# Patient Record
Sex: FEMALE | Race: WHITE | NOT HISPANIC OR LATINO | Employment: FULL TIME | URBAN - METROPOLITAN AREA
[De-identification: names, ages, dates, MRNs, and addresses within clinical notes are randomized per-mention and may not be internally consistent; named-entity substitution may affect disease eponyms.]

---

## 2017-05-02 ENCOUNTER — ALLSCRIPTS OFFICE VISIT (OUTPATIENT)
Dept: OTHER | Facility: OTHER | Age: 27
End: 2017-05-02

## 2017-05-02 ENCOUNTER — GENERIC CONVERSION - ENCOUNTER (OUTPATIENT)
Dept: OTHER | Facility: OTHER | Age: 27
End: 2017-05-02

## 2017-06-12 ENCOUNTER — ALLSCRIPTS OFFICE VISIT (OUTPATIENT)
Dept: OTHER | Facility: OTHER | Age: 27
End: 2017-06-12

## 2017-09-15 ENCOUNTER — ALLSCRIPTS OFFICE VISIT (OUTPATIENT)
Dept: OTHER | Facility: OTHER | Age: 27
End: 2017-09-15

## 2017-11-10 ENCOUNTER — ALLSCRIPTS OFFICE VISIT (OUTPATIENT)
Dept: OTHER | Facility: OTHER | Age: 27
End: 2017-11-10

## 2017-11-11 NOTE — PROGRESS NOTES
Assessment    1  Insomnia, unspecified type (780 52) (G47 00)   2  Anxiety (300 00) (F41 9)   3  Cervicalgia (723 1) (M54 2)    Plan  Insomnia, unspecified type    · ClonazePAM 0 5 MG Oral Tablet (KlonoPIN); TAKE 1/2 to 1 TABLET AT BEDTIME   · Follow Up if Not Better Evaluation and Treatment  Follow-up  Status: Complete  Done:10Nov2017 02:41PM    Discussion/Summary    Discussed with the patient, stop xanax due to not working nand try low dose klonopin at night  ok to take tylenol or ibuprofen for muscle spasm  f/up 2-3 months, call if any issues  hold off on any further sleep aids for now, had vivid dreaming from melatonin  ok to cont with work outs  avoid caffeine and alcohol , do not drink 2 hours before bed time  MRI of neck if not better, recomm massage  The patient was counseled regarding  Chief Complaint  pt here c/o not being able to sleep the last month and wakes up extremely exhausted and then her anxiety seems to get worse, pt is not sure what exactly is wrong      History of Present Illness  Insomnia (Brief): The patient is being seen for an initial evaluation of insomnia  Symptoms:  difficulty staying asleep,-- unrefreshing sleep,-- daytime sleepiness-- and-- anxiety upon awakening, but-- no difficulty falling asleep,-- no parasomnia-- and-- no sleeping at inappropriate times  No associated symptoms are reported  Neck Pain (Follow-Up): The patient is being seen for follow-up of neck pain  Occurred april 2016 and then restarted 1 week ago,after she moved  L trapezious   Anxiety Disorder (Follow-Up): The patient is being seen for follow-up of generalized anxiety disorder  The patient reports doing well  Intermittent flares   She has had no significant interval events  Associated symptoms: no grandiosity  Medications:  the patient is adherent to her medication regimen, but-- she denies medication side effects  Disease management:  the patient is doing well with her goals        Review of Systems   Constitutional: no fever,-- not feeling poorly,-- no recent weight gain,-- no chills,-- not feeling tired-- and-- no recent weight loss  Cardiovascular: no palpitations  Respiratory: no shortness of breath  Gastrointestinal: no nausea,-- no vomiting-- and-- no diarrhea  Genitourinary: no dysuria  Musculoskeletal: myalgias-- and-- neck- had xrays done  Neurological: no further panic attacks, but-- no headache,-- no numbness,-- no tingling,-- no confusion,-- no dizziness-- and-- no fainting  Other Symptoms: sleep issues  Active Problems  1  Anxiety (300 00) (F41 9)   2  Bilateral impacted cerumen (380 4) (H61 23)   3  Birth control (V25 9) (Z30 9)   4  Cervical strain (847 0) (S16 1XXA)   5  Cervicalgia (723 1) (M54 2)   6  Neck muscle spasm (728 85) (M62 838)   7  Rhinitis (472 0) (J31 0)    Social History     · Current some day smoker (305 1) (F17 200)   · Education history   · college degree   · Lives with roommates   · 2 - male and female in 19's   · No caffeine use   · No illicit drug use   · Occupation   · 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   · Single   · Social alcohol use (Z78 9)    Current Meds   1  ALPRAZolam 0 5 MG Oral Tablet; TAKE 1 TABLET TWICE DAILY AS NEEDED; Therapy: 19Sep2014 to (Evaluate:14Nov2017); Last Rx:15Sep2017 Ordered   2  Flonase Allergy Relief 50 MCG/ACT Nasal Suspension; USE 2 SPRAYS IN EACH NOSTRIL ONCE DAILY; Therapy: (Recorded:13Sep2017) to Recorded   3  Magnesium 250 MG Oral Tablet; TAKE 1 TABLET DAILY; Therapy: (Recorded:10Nov2017) to Recorded   4  Vitamin B12 TABS; TAKE 1 TABLET DAILY AS DIRECTED; Therapy: (Recorded:10Nov2017) to Recorded    The medication list was reviewed and updated today  Allergies  1  No Known Drug Allergies  2   Seasonal    Vitals   Recorded: [id number] 02:25PM   Temperature 98 3 F, Oral   Heart Rate 70   Systolic 926, LUE, Sitting   Diastolic 82, LUE, Sitting   Height 5 ft 6 in   Weight 167 lb    BMI Calculated 26 95   BSA Calculated 1 85   O2 Saturation 98, RA       Physical Exam   Constitutional  General appearance: No acute distress, well appearing and well nourished  Eyes  Conjunctiva and lids: No swelling, erythema or discharge  Pupils and irises: Equal, round and reactive to light  Ears, Nose, Mouth, and Throat  External inspection of ears and nose: Normal  -- cerumenimpaction b/l  Oropharynx: Normal with no erythema, edema, exudate or lesions  Pulmonary  Respiratory effort: No increased work of breathing or signs of respiratory distress  Auscultation of lungs: Clear to auscultation  Cardiovascular  Auscultation of heart: Normal rate and rhythm, normal S1 and S2, without murmurs  Examination of extremities for edema and/or varicosities: Normal    Carotid pulses: Normal    Abdomen  Abdomen: Non-tender, no masses  Liver and spleen: No hepatomegaly or splenomegaly  Musculoskeletal  Gait and station: Normal    Inspection/palpation of joints, bones, and muscles: Abnormal  -- b/l trapezious hypertonicity  Skin  Skin and subcutaneous tissue: Normal without rashes or lesions  Neurologic  Cranial nerves: Cranial nerves 2-12 intact  Reflexes: 2+ and symmetric  Sensation: No sensory loss     Psychiatric  Orientation to person, place, and time: Normal    Mood and affect: Normal          Signatures   Electronically signed by : Keisha Garcia DO; Nov 10 2017  2:43PM EST                       (Author)

## 2018-01-13 VITALS
HEART RATE: 81 BPM | SYSTOLIC BLOOD PRESSURE: 120 MMHG | DIASTOLIC BLOOD PRESSURE: 80 MMHG | BODY MASS INDEX: 26.71 KG/M2 | OXYGEN SATURATION: 98 % | TEMPERATURE: 98.9 F | WEIGHT: 165.5 LBS

## 2018-01-13 VITALS
BODY MASS INDEX: 26.4 KG/M2 | HEIGHT: 66 IN | OXYGEN SATURATION: 98 % | HEART RATE: 78 BPM | SYSTOLIC BLOOD PRESSURE: 112 MMHG | DIASTOLIC BLOOD PRESSURE: 80 MMHG | WEIGHT: 164.25 LBS | TEMPERATURE: 98.3 F

## 2018-01-13 NOTE — MISCELLANEOUS
To whom it may concern:    Parish Thomas , CHELSEA 1990, with a standing desk at work  Please do not hesitate to contact me with any questions,          Sincerely,          JAMES Cuenca        Electronically signed by:Della Galindo DO  Nov 10 2017  2:45PM EST

## 2018-01-14 VITALS
SYSTOLIC BLOOD PRESSURE: 122 MMHG | HEIGHT: 66 IN | TEMPERATURE: 98.3 F | BODY MASS INDEX: 26.84 KG/M2 | DIASTOLIC BLOOD PRESSURE: 82 MMHG | OXYGEN SATURATION: 98 % | WEIGHT: 167 LBS | HEART RATE: 70 BPM

## 2018-01-15 VITALS
SYSTOLIC BLOOD PRESSURE: 114 MMHG | DIASTOLIC BLOOD PRESSURE: 80 MMHG | TEMPERATURE: 97.9 F | BODY MASS INDEX: 26.74 KG/M2 | HEART RATE: 65 BPM | HEIGHT: 66 IN | OXYGEN SATURATION: 97 % | WEIGHT: 166.38 LBS

## 2021-04-25 ENCOUNTER — IMMUNIZATIONS (OUTPATIENT)
Dept: FAMILY MEDICINE CLINIC | Facility: HOSPITAL | Age: 31
End: 2021-04-25

## 2021-04-25 DIAGNOSIS — Z23 ENCOUNTER FOR IMMUNIZATION: Primary | ICD-10-CM

## 2021-04-25 PROCEDURE — 0001A SARS-COV-2 / COVID-19 MRNA VACCINE (PFIZER-BIONTECH) 30 MCG: CPT

## 2021-04-25 PROCEDURE — 91300 SARS-COV-2 / COVID-19 MRNA VACCINE (PFIZER-BIONTECH) 30 MCG: CPT

## 2021-05-23 ENCOUNTER — IMMUNIZATIONS (OUTPATIENT)
Dept: FAMILY MEDICINE CLINIC | Facility: HOSPITAL | Age: 31
End: 2021-05-23

## 2021-05-23 DIAGNOSIS — Z23 ENCOUNTER FOR IMMUNIZATION: Primary | ICD-10-CM

## 2021-05-23 PROCEDURE — 0002A SARS-COV-2 / COVID-19 MRNA VACCINE (PFIZER-BIONTECH) 30 MCG: CPT

## 2021-05-23 PROCEDURE — 91300 SARS-COV-2 / COVID-19 MRNA VACCINE (PFIZER-BIONTECH) 30 MCG: CPT

## 2023-06-28 ENCOUNTER — OFFICE VISIT (OUTPATIENT)
Dept: DERMATOLOGY | Facility: CLINIC | Age: 33
End: 2023-06-28
Payer: COMMERCIAL

## 2023-06-28 VITALS — HEIGHT: 66 IN | BODY MASS INDEX: 26.92 KG/M2 | WEIGHT: 167.5 LBS | TEMPERATURE: 98.4 F

## 2023-06-28 DIAGNOSIS — L70.9 ACNE, UNSPECIFIED ACNE TYPE: ICD-10-CM

## 2023-06-28 DIAGNOSIS — Z12.83 SCREENING FOR SKIN CANCER: Primary | ICD-10-CM

## 2023-06-28 RX ORDER — DEXTROAMPHETAMINE SACCHARATE, AMPHETAMINE ASPARTATE, DEXTROAMPHETAMINE SULFATE AND AMPHETAMINE SULFATE 2.5; 2.5; 2.5; 2.5 MG/1; MG/1; MG/1; MG/1
TABLET ORAL
COMMUNITY
Start: 2020-06-15

## 2023-06-28 RX ORDER — ALPRAZOLAM 0.5 MG/1
0.5 TABLET ORAL DAILY PRN
COMMUNITY
Start: 2015-09-05

## 2023-06-28 NOTE — PATIENT INSTRUCTIONS
"Physical Exam and Assessment/Plan by Diagnosis:    SCREENING FOR SKIN CANCER    Assessment and Plan:  Based on a thorough discussion of this condition and the management approach to it (including a comprehensive discussion of the known risks, side effects and potential benefits of treatment), the patient (family) agrees to implement the following specific plan: [place] SPF 30+ when outside with reapplication every 2-3 hours  Skin exams recommended every 6 months - Patient will call to schedule or schedule via the portal  Follow up sooner with any new or changing skin problems    ACNE VULGARIS (\"COMMON ACNE\")    Treatment plan:Based on a thorough discussion of this condition and the management approach to it (including a comprehensive discussion of the known risks, side effects and potential benefits of treatment), the patient (family) agrees to implement the following specific plan: Topical medications:    Skin Medicinals Regimen prescribed, today - Apply topically as directed        REMEMBER:  Always take your acne pills with lots of water! A pill stuck in your throat can cause significant burning and irritation  Drink a full glass of water to ensure the pill gets into your stomach  Avoid “popping” a pill right before bed, and stay upright for at least 1 hour after taking a pill  ACNE:  WHAT ZIT ALL ABOUT? WHY DO I HAVE ACNE/PIMPLES? Your skin is made of layers  To keep the skin from becoming dry and cracked, the skin needs oil  The oil is made in little wells in the deeper layers in the skin  People with acne have glands that make more oil and are more easily plugged, causing the glands to swell  Hormones, bacteria and your inherited tendency to have acne all play a role  The medical term for “pimples” is acne or acne vulgaris (vulgaris means “common”)  Most people get some acne  Acne does not come from being dirty    Instead, it is an expected consequence of changes that occur during normal growth " and development  Hormones, bacteria, and your family's tendency to have acne may all play a role  “Whiteheads” or “blackheads” are openings of the glands (glands are the oil factories) onto the surface of the skin  “Blackheads” are not caused by dirt blocking the pores; instead, they result from the oxidation reaction of oil and skin in the pores with the air (like a “rust” reaction)  WHAT ABOUT STRESS? Stress does not “cause” acne but it can make it worse  Make sure you get enough sleep and daily exercise! WHAT ABOUT FOODS/DIET? Try to eat a balanced, healthy diet  Some people feel that certain foods worsen their acne  While there aren't many studies available on this question, severe dietary changes are unlikely to help your acne and may be harmful to the health of your skin  If you find that a certain food seems to aggravate your acne, you may consider avoiding that food  Discuss this with your physician! WHAT CAUSES MY ACNE? There are four contributors to acne--the body's natural oil (sebum), clogged pores, bacteria (with the scientific name Propionibacterium acnes, or P  acnes, for short), and the body's reaction to the bacteria living in the clogged pores (which causes inflammation)  Here's what happens:    Sebum is produced in the normal oil-making glands in the deeper layers of the skin and reaches the surface through the skin's pores  An increase in certain hormones occurs around the time of puberty, and these hormones trigger the oil glands to produce increased amounts of sebum  Pores with excess oil tend to become clogged more easily  At the same time, P  acnes--one of the many types of bacteria that normally live on everyone's skin--thrives in the excess oil and causes a skin reaction (inflammation)  If a pore is clogged close to the surface, there is little inflammation   However, this results in the formation of “whiteheads” (closed comedones) or “blackheads” (open comedones) at the surface of the skin  A plug that extends to, or forms a little deeper in the pore, or one that enlarges or ruptures may cause more inflammation  The result is red bumps (papules) and pus-filled pimples (pustules)  If plugging happens in the deepest skin layer, the inflammation may be even more severe, resulting in the formation of nodules or cysts  When these types of acne heal, they may leave behind discolored areas or true scars  SKIN HYGIENE:  HOW SHOULD I [address] Labidi MY SKIN? Acne does not come from being dirty, however, washing your face is part of taking good care of your skin and will help keep your face clear  Good skin hygiene is, therefore, critical to support any acne treatment plan  Here are several specific suggestions for practicing good skin hygiene and keeping your skin looking its best:    You should wash acne-prone skin TWICE A DAY: Once in the morning and once in the evening  This does include any showers you take that day, so do not overdo it! Do not scrub the skin with a washcloth or loofah as these can irritate and inflame your acne  Acne does not come from “dirt”, so it is not necessary to scrub the skin clean  In fact, scrubbing may lead to dryness and irritation that makes the acne even worse and harder for patients to tolerate acne medications  Use a gentle facial moisturizing cleanser (Cetaphil Moisturizing Cleanser or Dove Fragrance-Free bar)  Avoid using soaps like Agustin Rios, Desiree Fernández [address], [facility], or soft/liquid soaps as these products will dry your skin  Do not use any over-the-counter “acne washes” without your doctor's specific instruction to do so  These products often contain salicylic acid or benzoyl peroxide  These ingredients can be helpful in clearing oil from the skin and reducing bacteria, but they may also be drying and can add to irritation     Do not use exfoliating products with microbeads or brushes as these can cause irritation to the skin which can worsen the acne  Facials and other treatments to remove, squeeze, or “clean out” pores, if done by a , can help the acne  They should not be done more than every 8 weeks  Try not to “pop pimples” or pick at your acne as this can delay healing and may result in scarring or skin color changes (“dark spots”) that are often more noticeable than the acne itself  Picking/popping acne can also cause a serious skin infection  Wash or change your pillow case once to twice a week, especially if you use products in your hair  Wash the skin as soon as possible after playing sports or other activities that cause a lot of sweating  Also, pay attention to how your sports equipment (shoulder pads, helmet strap, etc ) might be making your acne worse  When you use makeup, moisturizer, or sunscreen make sure that these products are labeled “non-comedogenic,” or “won't clog pores,” or “won't cause acne ”           WHAT ACNE TREATMENTS ARE AVAILABLE? Medications for acne try to stop the formation of new pimples by reducing or removing the oil, bacteria, and other things (like dead skin cells) that clog the pores  They can also decrease the inflammation or irritation response of the skin to bacteria  It may take from 6 to 8 weeks (about 2 months!) before you see any improvement and know if the medication is effective  It takes the layers of skin this long to regenerate  Remember, these medications do not “cure” the condition--the acne improves because of the medication  Therefore, treatment must be continued in order to prevent the return of acne lesions  There are many types of acne treatments  Some are applied to the skin (“topical” medications) and some are taken by mouth (“oral” medications)  In most cases of mild acne, the doctor will start with a topical medication  There are many different topical medications that are helpful for acne    If acne is more severe and it does not respond adequately to a topical medication, or if it covers large body surface areas such as the back and/or chest, oral antibiotics such as Doxycycline or Minocycline and/or oral hormone therapy such as Oral Contraceptive Pills or Spironolactone may be prescribed  In the most severe cases, isotretinoin (Accutane) may be used  In general, it is usually best to start with acne medications that are least likely to cause side effects but are at the same time capable of addressing the specific causes for the acne  Some patients have a good result with just one medication, but many will need to use a combination of treatments: two or more different topical agents or an oral medication plus a topical medication  Another treatment used for acne may include corticosteroid injections, which are used to help relieve pain, decrease the size, and encourage the healing of large, inflamed acne nodules  Also, dermatologists sometimes perform “acne surgery,” using a fine needle, a pointed blade, or an instrument known as a comedone extractor to mechanically clean out clogged pores  One must always weigh the risk for inducing a scar with the potential benefits of any procedure  Prior treatment with topical retinoids can “loosen” whiteheads and blackheads and make it easier to physically remove such lesions  Heat-based devices, and light and laser therapy are being studied to see whether there is any role for such treatments in mild to moderate acne  At this time, there is not enough evidence to make general recommendations about their use  TOPICAL ACNE MEDICATIONS    WHAT KIND OF TOPICALS ARE THERE? Benzoyl peroxide (BP) helps to fight inflammation and is anti-microbial (kills bacteria, viruses, and other microorganisms) and is believed to help prevent resistance of bacteria to topical antibiotics  A benzoyl peroxide “wash” may be recommended for use on large areas such as the chest and/or back   Mild irritation and dryness are common when first using benzoyl peroxide-containing products  Be careful because benzoyl peroxide can bleach towels and clothing! Retinoids (such as adapalene, tretinoin, or tazarotene) unplug the oil glands by helping peel away the layers of skin and other things plugging the opening of the glands  Mild irritation and dryness are common when first using these products  Facial waxing and other skin procedures can lead to excessive irritation and should be avoided during retinoid therapy  Antibiotics fight bacteria and help decrease inflammation  Topical antibiotics commonly used in acne include clindamycin, erythromycin, and combination agents (such as clindamycin/benzoyl peroxide or erythromycin/benzoyl peroxide)  Mild irritation and dryness are common when first using these products  Typically, topical antibiotics should not be used alone as treatment for acne  Other topical agents include salicylic acid, azelaic acid, dapsone, and sulfacetamide  Mild irritation and dryness can also occur when first using these products  USING YOUR TOPICAL TREATMENTS LIKE A PRO  Apply topical medications only to clean, dry skin  Topical medications may lead to significant dryness of the affected areas  To minimize this, wait 15-20 minutes after washing before applying your topical medication  These medications work deep in the skin to prevent new breakouts  “Spot treatment” of individual pimples does not do much  When applying topical medications to the face, use the “5-dot” method  Start by placing a small pea-sized amount of the medication on your finger  Then, place “dots” in each of five locations of your face: Mid-forehead, each cheek, nose, and chin  Next, rub the medication into the entire area of skin - not just on individual pimples! Try to avoid the delicate skin around your eyes and corners of your mouth  The medications are not magic! They take weeks if not months to work   Be patient and use your medicine on a daily basis or as directed for six weeks before asking if your skin looks better  Try not to miss more than one or two days each week when using your medications  If you are starting a new medication, then try using it “every other night” or even “every third night ” Gradually work up to [facility] a day ”  This will give your skin time to adjust   The same medications often come in various forms or formulations: Creams, ointments, lotions, gels, microspheres, or foams  Use the formulation that has been recommended and don't switch to other forms unless instructed  Some forms (such as alcohol based gels) may be more drying and less tolerable for certain skin types  Sometimes individual medications are not as effective as a combination of two or more agents  The doctor may need to try several medications or combinations before finding the one that is best for that patient  Moisturizer, sunscreen, and make-up may be used in conjunction with topical acne medications  In general, acne medications are applied first so they may directly contact the skin  Ask your physician to review specific application instructions! It is especially important to always use sunscreen when using a topical retinoid or oral antibiotic  These drugs can make your skin more sensitive to the sun  In general, sunscreen gets applied AFTER any acne medications  Don't stop using your acne medications just because your acne got better  Remember, the acne is better because of the medication, and prevention is the rodriguez to treatment  ORAL ACNE MEDICATIONS    ORAL ANTIBIOTICS  Antibiotics include tetracycline-class medicines (which include the most commonly used oral antibiotics for acne, minocycline, and doxycycline), erythromycin, trimethoprim-sulfamethoxazole, and occasionally cephalexin or azithromycin  These drugs may decrease bacteria and inflammation, and they are most effective for moderate-to-severe “inflammatory” acne   A product containing benzoyl "peroxide should be used along with these antibiotics to help decrease the possibility of microbial resistance  Always take your acne pills with lots of water! A pill stuck in your throat can cause significant burning and irritation  Drink a full glass of water to ensure the pill gets into your stomach  Avoid “popping” a pill right before bed, and stay upright for at least 1 hour after taking a pill  HORMONAL THERAPY  Hormonal treatment is used only in females and usually consists of oral contraceptives (birth control pills)  Spironolactone is also sometimes used  ORAL CONTRACEPTIVE PILLS   This medication is also known as the “Birth Control Pill ”  We use it for hormonal regulation of acne  Take this medication as directed on the medication packet  NOTE: Try to find a regular time in your day to take the pill so that you don't forget  The best time is about half an hour after a meal or snack, or at bedtime  If you do forget to take your daily pill at the regular time, take one as soon as you remember and take the next at your regular scheduled time  WARNING: Do not take this medication until discussing it with your physician if you smoke, are pregnant (or trying to become pregnant or could be pregnant), have a personal history of breast cancer, have any artificial hardware or implants, have a condition called Factor 5 Leiden deficiency, have a family history of clotting problems, regularly have migraine headaches (especially with aura or due to flashing lights), or have any vaginal bleeding other than that associated with your menstrual cycle  SPIRONOLACTONE  This medication was originally used as a \"water-pill\" for patients with high blood pressure  Dermatologists use it in low doses to block the effects of male hormone on the hair follicle and oil glands      ORAL ISOTRETINOIN (used to be called the brand name “ACCUTANE”)  Isotretinoin, a derivative of vitamin A, is a powerful drug with " several significant potential side effects  It is reserved for acne which is severe or when other medications have not worked well enough  It used to be sold under the brand name “Accutane” but now several versions exist       HAVING PROBLEMS WITH ANY OF YOUR TREATMENTS? You should not be able to see any of the medicines on your face  If you can see a white film on your skin after you apply the medication, there is too much medicine in that area and you need to apply a thinner coat and make sure it is spread evenly on your face  If your skin gets too dry, you can apply a light (“non-comedogenic”) moisturizer on top of your medicine or you may switch to using the medicine every other day instead of every day  If your skin is still too irritated, you may need to switch to a milder medication  If your skin is red and very itchy, you may be allergic to the medication and you should stop using it  COMMON POSSIBLE SIDE EFFECTS OF MEDICATIONS    Retinoids - dryness, redness, increased sun sensitivity  Spironolactone - headache, upset stomach, dizziness, breast tenderness or irregular periods  Usually these effects lessen with time  Benzoyl peroxide - drying, redness, bleaching of clothes, towels and sheets, allergy  Doxycycline - headaches; dizziness; irritation of the throat; nail changes; discoloration of teeth  Sun sensitivity - even if you have dark skin, this medicine can make you burn more easily  Make sure you protect yourself from the sun, either by avoiding being outside between 11 AM and 3 PM, wearing and reapplying sunscreen/sunblock, or wearing sun protective clothing  Nausea/vomiting - if you experience nausea with this medication, take it with food  Minocycline - headaches; dizziness; vision problems,  irritation of the throat; discoloration of scars, gums, or teeth  Can rarely cause liver disease, joint pains, and flu-like symptoms      If you should notice yellowing of the skin or any of the above, notify your doctor and stop using the medication  Birth Control Pills - nausea; headaches; breast tenderness; feeling bloated; mood changes  Spotting between periods may occur for the first three weeks of the medication, but this is not serious  It may last for two or three cycles  Please call us if the bleeding is heavier than a light flow or lasts for more than a few days  WHEN AND WHERE TO CALL WITH CONCERNS  We are here to help! If you experience any unusual symptoms, then stop taking or using the medication and call our office at [phone] ([phone])  It is better to be safe than to be sorry! [facility]     We use this service to prescribe medications that are often not covered by insurance  Your physician will send your prescription to the pharmacy  You will receive an email from [email or web address] where you will follow the instructions within the email to provide your billing and shipping information  Your medicine will be shipped directly to you  If you have any questions, you can call [phone] or email [email or web address]

## 2023-06-28 NOTE — PROGRESS NOTES
"[facility] Note     Patient Name: Shana Germain  Encounter Date: 6/28/23    Have you been cared for by a [facility] in the last 3 years and, if so, which description applies to you? NO  I am considered a \"new\" patient and must complete all patient intake questions  I am FEMALE/of child-bearing potential     REVIEW OF SYSTEMS:  Have you recently had or currently have any of the following? · Recent fever or chills? No  · Any non-healing wound? No  · Are you pregnant or planning to become pregnant? No  · Are you currently or planning to be nursing or breast feeding? No   PAST MEDICAL HISTORY:  Have you personally ever had or currently have any of the following? If \"YES,\" then please provide more detail  · Skin cancer (such as Melanoma, Basal Cell Carcinoma, Squamous Cell Carcinoma? No  · Tuberculosis, HIV/AIDS, Hepatitis B or C: No  · Systemic Immunosuppression such as Diabetes, Biologic or Immunotherapy, Chemotherapy, Organ Transplantation, Bone Marrow Transplantation No  · Radiation Treatment No   FAMILY HISTORY:  Any \"first degree relatives\" (parent, brother, sister, or child) with the following? • Skin Cancer, Pancreatic or Other Cancer? YES, Family history of skin cancer, colon cancer and lung cancer   PATIENT EXPERIENCE:    • Do you want the Dermatologist to perform a COMPLETE skin exam today including a clinical examination under the \"bra and underwear\" areas? Yes  • If necessary, do we have your permission to call and leave a detailed message on your Preferred Phone number that includes your specific medical information? Yes      Not on File No current outpatient medications on file            • Whom besides the patient is providing clinical information about today's encounter?   o NO ADDITIONAL HISTORIAN (patient alone provided history)    Physical Exam and Assessment/Plan by Diagnosis:    SCREENING FOR SKIN CANCER    Physical Exam:  • Anatomic Location Affected:  " "Entire body   • Morphological Description:    • Pertinent Positives:  • Pertinent Negatives: Additional History of Present Condition:  New patient, 28year old female, here today for a general skin exam  Patient denies any personal history of skin cancer but she does have a family history of skin cancer  Patient has had multiple moles removed before but they have all came back benign  Patient reports having freckley skin since childhood  The patient noted that she had 1 lesion removed years ago and it was 50% mutated  Assessment and Plan:  Based on a thorough discussion of this condition and the management approach to it (including a comprehensive discussion of the known risks, side effects and potential benefits of treatment), the patient (family) agrees to implement the following specific plan:      • SPF 30+ when outside with reapplication every 2-3 hours  • Skin exams recommended every 6 months - Patient will call to schedule or schedule via the portal  • Follow up sooner with any new or changing skin problems    History of ACNE (\"COMMON ACNE\")    Physical Exam:  • Anatomic Location Affected:  Chest    Additional History of Present Condition:  Addressed during exam     Treatment plan:Based on a thorough discussion of this condition and the management approach to it (including a comprehensive discussion of the known risks, side effects and potential benefits of treatment), the patient (family) agrees to implement the following specific plan: Topical medications:    [facility] Regimen prescribed, today - Apply topically as directed    Tretinoin: 0 025%  Niacinamide: 2%  Azelaic Acid: 8%  Sodium Hyaluronate: 0 25%  Vehicle: Cream    Apply sparingly 1 hr before bedtime to affected areas  Not for use during pregnancy  REMEMBER:  Always take your acne pills with lots of water! A pill stuck in your throat can cause significant burning and irritation     Drink a full glass of water to ensure the pill " gets into your stomach  Avoid “popping” a pill right before bed, and stay upright for at least 1 hour after taking a pill  ACNE:  WHAT ZIT ALL ABOUT? WHY DO I HAVE ACNE/PIMPLES? Your skin is made of layers  To keep the skin from becoming dry and cracked, the skin needs oil  The oil is made in little wells in the deeper layers in the skin  People with acne have glands that make more oil and are more easily plugged, causing the glands to swell  Hormones, bacteria and your inherited tendency to have acne all play a role  The medical term for “pimples” is acne or acne vulgaris (vulgaris means “common”)  Most people get some acne  Acne does not come from being dirty  Instead, it is an expected consequence of changes that occur during normal growth and development  Hormones, bacteria, and your family's tendency to have acne may all play a role  “Whiteheads” or “blackheads” are openings of the glands (glands are the oil factories) onto the surface of the skin  “Blackheads” are not caused by dirt blocking the pores; instead, they result from the oxidation reaction of oil and skin in the pores with the air (like a “rust” reaction)  WHAT ABOUT STRESS? Stress does not “cause” acne but it can make it worse  Make sure you get enough sleep and daily exercise! WHAT ABOUT FOODS/DIET? Try to eat a balanced, healthy diet  Some people feel that certain foods worsen their acne  While there aren't many studies available on this question, severe dietary changes are unlikely to help your acne and may be harmful to the health of your skin  If you find that a certain food seems to aggravate your acne, you may consider avoiding that food  Discuss this with your physician! WHAT CAUSES MY ACNE?   There are four contributors to acne--the body's natural oil (sebum), clogged pores, bacteria (with the scientific name Propionibacterium acnes, or P  acnes, for short), and the body's reaction to the bacteria living in the clogged pores (which causes inflammation)  Here's what happens:    • Sebum is produced in the normal oil-making glands in the deeper layers of the skin and reaches the surface through the skin's pores  An increase in certain hormones occurs around the time of puberty, and these hormones trigger the oil glands to produce increased amounts of sebum  • Pores with excess oil tend to become clogged more easily  • At the same time, P  acnes--one of the many types of bacteria that normally live on everyone's skin--thrives in the excess oil and causes a skin reaction (inflammation)  • If a pore is clogged close to the surface, there is little inflammation  However, this results in the formation of “whiteheads” (closed comedones) or “blackheads” (open comedones) at the surface of the skin  • A plug that extends to, or forms a little deeper in the pore, or one that enlarges or ruptures may cause more inflammation  The result is red bumps (papules) and pus-filled pimples (pustules)  • If plugging happens in the deepest skin layer, the inflammation may be even more severe, resulting in the formation of nodules or cysts  When these types of acne heal, they may leave behind discolored areas or true scars  SKIN HYGIENE:  HOW SHOULD I [facility] MY SKIN? Acne does not come from being dirty, however, washing your face is part of taking good care of your skin and will help keep your face clear  Good skin hygiene is, therefore, critical to support any acne treatment plan  Here are several specific suggestions for practicing good skin hygiene and keeping your skin looking its best:    • You should wash acne-prone skin TWICE A DAY: Once in the morning and once in the evening  This does include any showers you take that day, so do not overdo it! • Do not scrub the skin with a washcloth or loofah as these can irritate and inflame your acne  Acne does not come from “dirt”, so it is not necessary to scrub the skin clean   In fact, scrubbing may lead to dryness and irritation that makes the acne even worse and harder for patients to tolerate acne medications  • Use a gentle facial moisturizing cleanser (Cetaphil Moisturizing Cleanser or Dove Fragrance-Free bar)  Avoid using soaps like Desiree [address], [address] [place], or soft/liquid soaps as these products will dry your skin  • Do not use any over-the-counter “acne washes” without your doctor's specific instruction to do so  These products often contain salicylic acid or benzoyl peroxide  These ingredients can be helpful in clearing oil from the skin and reducing bacteria, but they may also be drying and can add to irritation  • Do not use exfoliating products with microbeads or brushes as these can cause irritation to the skin which can worsen the acne  • Facials and other treatments to remove, squeeze, or “clean out” pores, if done by a , can help the acne  They should not be done more than every 8 weeks  • Try not to “pop pimples” or pick at your acne as this can delay healing and may result in scarring or skin color changes (“dark spots”) that are often more noticeable than the acne itself  Picking/popping acne can also cause a serious skin infection  • Wash or change your pillow case once to twice a week, especially if you use products in your hair  • Wash the skin as soon as possible after playing sports or other activities that cause a lot of sweating  Also, pay attention to how your sports equipment (shoulder pads, helmet strap, etc ) might be making your acne worse  • When you use makeup, moisturizer, or sunscreen make sure that these products are labeled “non-comedogenic,” or “won't clog pores,” or “won't cause acne ”           WHAT ACNE TREATMENTS ARE AVAILABLE? Medications for acne try to stop the formation of new pimples by reducing or removing the oil, bacteria, and other things (like dead skin cells) that clog the pores   They can also decrease the inflammation or irritation response of the skin to bacteria  It may take from 6 to 8 weeks (about 2 months!) before you see any improvement and know if the medication is effective  It takes the layers of skin this long to regenerate  Remember, these medications do not “cure” the condition--the acne improves because of the medication  Therefore, treatment must be continued in order to prevent the return of acne lesions  There are many types of acne treatments  Some are applied to the skin (“topical” medications) and some are taken by mouth (“oral” medications)  In most cases of mild acne, the doctor will start with a topical medication  There are many different topical medications that are helpful for acne  If acne is more severe and it does not respond adequately to a topical medication, or if it covers large body surface areas such as the back and/or chest, oral antibiotics such as Doxycycline or Minocycline and/or oral hormone therapy such as Oral Contraceptive Pills or Spironolactone may be prescribed  In the most severe cases, isotretinoin (Accutane) may be used  In general, it is usually best to start with acne medications that are least likely to cause side effects but are at the same time capable of addressing the specific causes for the acne  Some patients have a good result with just one medication, but many will need to use a combination of treatments: two or more different topical agents or an oral medication plus a topical medication  Another treatment used for acne may include corticosteroid injections, which are used to help relieve pain, decrease the size, and encourage the healing of large, inflamed acne nodules  Also, dermatologists sometimes perform “acne surgery,” using a fine needle, a pointed blade, or an instrument known as a comedone extractor to mechanically clean out clogged pores   One must always weigh the risk for inducing a scar with the potential benefits of any procedure  Prior treatment with topical retinoids can “loosen” whiteheads and blackheads and make it easier to physically remove such lesions  Heat-based devices, and light and laser therapy are being studied to see whether there is any role for such treatments in mild to moderate acne  At this time, there is not enough evidence to make general recommendations about their use  TOPICAL ACNE MEDICATIONS    WHAT KIND OF TOPICALS ARE THERE? • Benzoyl peroxide (BP) helps to fight inflammation and is anti-microbial (kills bacteria, viruses, and other microorganisms) and is believed to help prevent resistance of bacteria to topical antibiotics  A benzoyl peroxide “wash” may be recommended for use on large areas such as the chest and/or back  Mild irritation and dryness are common when first using benzoyl peroxide-containing products  Be careful because benzoyl peroxide can bleach towels and clothing! • Retinoids (such as adapalene, tretinoin, or tazarotene) unplug the oil glands by helping peel away the layers of skin and other things plugging the opening of the glands  Mild irritation and dryness are common when first using these products  Facial waxing and other skin procedures can lead to excessive irritation and should be avoided during retinoid therapy  • Antibiotics fight bacteria and help decrease inflammation  Topical antibiotics commonly used in acne include clindamycin, erythromycin, and combination agents (such as clindamycin/benzoyl peroxide or erythromycin/benzoyl peroxide)  Mild irritation and dryness are common when first using these products  Typically, topical antibiotics should not be used alone as treatment for acne  • Other topical agents include salicylic acid, azelaic acid, dapsone, and sulfacetamide  Mild irritation and dryness can also occur when first using these products  USING YOUR TOPICAL TREATMENTS LIKE A PRO  • Apply topical medications only to clean, dry skin   Topical medications may lead to significant dryness of the affected areas  To minimize this, wait 15-20 minutes after washing before applying your topical medication  • These medications work deep in the skin to prevent new breakouts  “Spot treatment” of individual pimples does not do much  When applying topical medications to the face, use the “5-dot” method  Start by placing a small pea-sized amount of the medication on your finger  Then, place “dots” in each of five locations of your face: Mid-forehead, each cheek, nose, and chin  Next, rub the medication into the entire area of skin - not just on individual pimples! Try to avoid the delicate skin around your eyes and corners of your mouth  • The medications are not magic! They take weeks if not months to work  Be patient and use your medicine on a daily basis or as directed for six weeks before asking if your skin looks better  Try not to miss more than one or two days each week when using your medications  • If you are starting a new medication, then try using it “every other night” or even “every third night ” Gradually work up to [employer] a day ”  This will give your skin time to adjust   • The same medications often come in various forms or formulations: Creams, ointments, lotions, gels, microspheres, or foams  Use the formulation that has been recommended and don't switch to other forms unless instructed  Some forms (such as alcohol based gels) may be more drying and less tolerable for certain skin types  • Sometimes individual medications are not as effective as a combination of two or more agents  The doctor may need to try several medications or combinations before finding the one that is best for that patient  • Moisturizer, sunscreen, and make-up may be used in conjunction with topical acne medications  In general, acne medications are applied first so they may directly contact the skin  Ask your physician to review specific application instructions!   • It is especially important to always use sunscreen when using a topical retinoid or oral antibiotic  These drugs can make your skin more sensitive to the sun  In general, sunscreen gets applied AFTER any acne medications  • Don't stop using your acne medications just because your acne got better  Remember, the acne is better because of the medication, and prevention is the rodriguez to treatment  ORAL ACNE MEDICATIONS    ORAL ANTIBIOTICS  Antibiotics include tetracycline-class medicines (which include the most commonly used oral antibiotics for acne, minocycline, and doxycycline), erythromycin, trimethoprim-sulfamethoxazole, and occasionally cephalexin or azithromycin  These drugs may decrease bacteria and inflammation, and they are most effective for moderate-to-severe “inflammatory” acne  A product containing benzoyl peroxide should be used along with these antibiotics to help decrease the possibility of microbial resistance  Always take your acne pills with lots of water! A pill stuck in your throat can cause significant burning and irritation  Drink a full glass of water to ensure the pill gets into your stomach  Avoid “popping” a pill right before bed, and stay upright for at least 1 hour after taking a pill  HORMONAL THERAPY  Hormonal treatment is used only in females and usually consists of oral contraceptives (birth control pills)  Spironolactone is also sometimes used  ORAL CONTRACEPTIVE PILLS   This medication is also known as the “Birth Control Pill ”  We use it for hormonal regulation of acne  Take this medication as directed on the medication packet  NOTE: Try to find a regular time in your day to take the pill so that you don't forget  The best time is about half an hour after a meal or snack, or at bedtime  If you do forget to take your daily pill at the regular time, take one as soon as you remember and take the next at your regular scheduled time     WARNING: Do not take this medication "until discussing it with your physician if you smoke, are pregnant (or trying to become pregnant or could be pregnant), have a personal history of breast cancer, have any artificial hardware or implants, have a condition called Factor 5 Leiden deficiency, have a family history of clotting problems, regularly have migraine headaches (especially with aura or due to flashing lights), or have any vaginal bleeding other than that associated with your menstrual cycle  SPIRONOLACTONE  This medication was originally used as a \"water-pill\" for patients with high blood pressure  Dermatologists use it in low doses to block the effects of male hormone on the hair follicle and oil glands  ORAL ISOTRETINOIN (used to be called the brand name “ACCUTANE”)  Isotretinoin, a derivative of vitamin A, is a powerful drug with several significant potential side effects  It is reserved for acne which is severe or when other medications have not worked well enough  It used to be sold under the brand name “Accutane” but now several versions exist       HAVING PROBLEMS WITH ANY OF YOUR TREATMENTS? You should not be able to see any of the medicines on your face  If you can see a white film on your skin after you apply the medication, there is too much medicine in that area and you need to apply a thinner coat and make sure it is spread evenly on your face  If your skin gets too dry, you can apply a light (“non-comedogenic”) moisturizer on top of your medicine or you may switch to using the medicine every other day instead of every day  If your skin is still too irritated, you may need to switch to a milder medication  If your skin is red and very itchy, you may be allergic to the medication and you should stop using it  COMMON POSSIBLE SIDE EFFECTS OF MEDICATIONS    • Retinoids - dryness, redness, increased sun sensitivity    • Spironolactone - headache, upset stomach, dizziness, breast tenderness or irregular " periods  Usually these effects lessen with time  • Benzoyl peroxide - drying, redness, bleaching of clothes, towels and sheets, allergy  • Doxycycline - headaches; dizziness; irritation of the throat; nail changes; discoloration of teeth  • Sun sensitivity - even if you have dark skin, this medicine can make you burn more easily  Make sure you protect yourself from the sun, either by avoiding being outside between 11 AM and 3 PM, wearing and reapplying sunscreen/sunblock, or wearing sun protective clothing  • Nausea/vomiting - if you experience nausea with this medication, take it with food  • Minocycline - headaches; dizziness; vision problems,  irritation of the throat; discoloration of scars, gums, or teeth  Can rarely cause liver disease, joint pains, and flu-like symptoms  • If you should notice yellowing of the skin or any of the above, notify your doctor and stop using the medication  • Birth Control Pills - nausea; headaches; breast tenderness; feeling bloated; mood changes  • Spotting between periods may occur for the first three weeks of the medication, but this is not serious  It may last for two or three cycles  Please call us if the bleeding is heavier than a light flow or lasts for more than a few days  WHEN AND WHERE TO CALL WITH CONCERNS  We are here to help! If you experience any unusual symptoms, then stop taking or using the medication and call our office at [phone] ([phone])  It is better to be safe than to be sorry! [facility]     We use this service to prescribe medications that are often not covered by insurance  Your physician will send your prescription to the pharmacy  You will receive an email from [email or web address] where you will follow the instructions within the email to provide your billing and shipping information  Your medicine will be shipped directly to you      If you have any questions, you can call [phone] or email [email or web address]      Scribe Attestation    I,:  Raoul Desir am acting as a scribe while in the presence of the attending physician :       I,:  Lowell Venegas MD personally performed the services described in this documentation    as scribed in my presence :

## 2024-01-09 ENCOUNTER — OFFICE VISIT (OUTPATIENT)
Dept: INTERNAL MEDICINE CLINIC | Facility: CLINIC | Age: 34
End: 2024-01-09
Payer: COMMERCIAL

## 2024-01-09 VITALS
TEMPERATURE: 96.9 F | SYSTOLIC BLOOD PRESSURE: 116 MMHG | BODY MASS INDEX: 27.32 KG/M2 | DIASTOLIC BLOOD PRESSURE: 78 MMHG | HEART RATE: 90 BPM | OXYGEN SATURATION: 99 % | HEIGHT: 66 IN | WEIGHT: 170 LBS

## 2024-01-09 DIAGNOSIS — F41.9 ANXIETY: ICD-10-CM

## 2024-01-09 DIAGNOSIS — Z79.899 ENCOUNTER FOR LONG-TERM CURRENT USE OF MEDICATION: ICD-10-CM

## 2024-01-09 DIAGNOSIS — D72.819 LEUKOPENIA, UNSPECIFIED TYPE: ICD-10-CM

## 2024-01-09 DIAGNOSIS — L70.0 ACNE VULGARIS: ICD-10-CM

## 2024-01-09 DIAGNOSIS — Z00.00 LABORATORY EXAMINATION ORDERED AS PART OF A ROUTINE GENERAL MEDICAL EXAMINATION: ICD-10-CM

## 2024-01-09 DIAGNOSIS — M79.18 MYOFASCIAL PAIN SYNDROME, CERVICAL: ICD-10-CM

## 2024-01-09 DIAGNOSIS — F90.2 ATTENTION DEFICIT HYPERACTIVITY DISORDER (ADHD), COMBINED TYPE: Primary | ICD-10-CM

## 2024-01-09 DIAGNOSIS — Z00.00 HEALTH MAINTENANCE EXAMINATION: ICD-10-CM

## 2024-01-09 PROBLEM — K59.09 OTHER CONSTIPATION: Status: ACTIVE | Noted: 2021-08-23

## 2024-01-09 PROBLEM — N92.6 IRREGULAR PERIODS/MENSTRUAL CYCLES: Status: ACTIVE | Noted: 2024-01-09

## 2024-01-09 PROBLEM — K59.09 OTHER CONSTIPATION: Status: RESOLVED | Noted: 2021-08-23 | Resolved: 2024-01-09

## 2024-01-09 PROBLEM — Z72.0 VAPES NICOTINE CONTAINING SUBSTANCE: Status: ACTIVE | Noted: 2024-01-09

## 2024-01-09 PROBLEM — J31.0 RHINITIS: Status: ACTIVE | Noted: 2017-09-13

## 2024-01-09 PROCEDURE — 99204 OFFICE O/P NEW MOD 45 MIN: CPT | Performed by: INTERNAL MEDICINE

## 2024-01-09 NOTE — PROGRESS NOTES
Assessment/Plan:    Myofascial pain syndrome, cervical  Reviewed recent normal x-ray.  Went to physical therapy.  Recommend to apply warm moist heat followed by daily stretching exercises.  Revise work station, take breaks every hour.    Leukopenia  Repeat CBC.    Attention deficit hyperactivity disorder (ADHD), combined type  On Adderall.  Per psych.    Anxiety  Rare use of alprazolam.    Acne vulgaris  Recently seen by dermatology.    Irregular periods/menstrual cycles  Stopped OCP April 2022.  Follow up with gynecology.    Vapes nicotine containing substance  Discussed smoking cessation.       Diagnoses and all orders for this visit:    Attention deficit hyperactivity disorder (ADHD), combined type    Anxiety  -     Comprehensive metabolic panel  -     TSH, 3rd generation with Free T4 reflex    Myofascial pain syndrome, cervical    Health maintenance examination  Comments:  Declined flu vaccine. PAPs due.    Laboratory examination ordered as part of a routine general medical examination  -     CBC and differential  -     Comprehensive metabolic panel  -     Lipid panel  -     TSH, 3rd generation with Free T4 reflex  -     Vitamin B12    Encounter for long-term current use of medication  -     Vitamin B12    Leukopenia, unspecified type  -     CBC and differential    Acne vulgaris      Follow up in 1 year or as needed.      Subjective:      Patient ID: Leida Jean is a 33 y.o. female here to establish care.    She fell in a manhole and developed a concussion in May 2014.  She was experiencing neck pain at that time.  She recalls taking gabapentin for 6 months.  Since then, she has had intermittent neck pain.  She has occasional burning sensation and a pulling sensation on her left side.  She had an x-ray recently which was normal.  She went to physical therapy last year.  She continues to do her exercises occasionally.  She applies heat.  She does not take Tylenol or other over-the-counter medications since it  bothers her stomach.    She reports seeing the dentist, may have TMJ issues.  She is going to be fitted for a mouthguard which she will use during the day and at night.  She reports no issues with eating, no recent jaw pain.    She has always had irregular menstrual periods.  She has been on birth control for the last 17 years, stopped taking it April of last year.  She had a normal-best cycle recently.    She quit smoking 2 years ago, was smoking about 5 to 6 cigarettes daily.  She is currently using Juul, uses a cartridge every week or so.    She sees her psychiatrist every other week, currently on Adderall.  She takes this every other day      The following portions of the patient's history were reviewed and updated as appropriate: allergies, current medications, past family history, past medical history, past social history, past surgical history, and problem list.    Past Medical History:   Diagnosis Date   • Acne    • Allergic    • Concussion 2014    Fell in man hole   • Depression 2004   • Eczema    • Headache(784.0)    • Skin tag      Past Surgical History:   Procedure Laterality Date   • APPENDECTOMY  December 2014   • SKIN BIOPSY  April 2020     Family History   Problem Relation Age of Onset   • Arthritis Mother    • Eczema Father         Only around hands and elbows   • Squamous cell carcinoma Father    • Acne Brother    • Colon cancer Maternal Grandmother    • Hypertension Maternal Grandmother    • Stroke Maternal Grandmother    • Lung cancer Maternal Grandfather    • Dementia Paternal Grandmother      Social History     Socioeconomic History   • Marital status: Single     Spouse name: Not on file   • Number of children: Not on file   • Years of education: Not on file   • Highest education level: Not on file   Occupational History   • Not on file   Tobacco Use   • Smoking status: Former     Current packs/day: 0.00     Average packs/day: 0.3 packs/day for 16.5 years (4.1 ttl pk-yrs)     Types: Cigarettes      Start date: 9/10/2005     Quit date: 3/1/2022     Years since quittin.8   • Smokeless tobacco: Never   • Tobacco comments:     Still occasionally smoke Juul (vape)   Vaping Use   • Vaping status: Some Days   • Substances: Nicotine   Substance and Sexual Activity   • Alcohol use: Yes     Alcohol/week: 15.0 standard drinks of alcohol     Types: 3 Glasses of wine, 12 Cans of beer per week     Comment: On weekends- binge   • Drug use: Never   • Sexual activity: Yes     Partners: Male     Birth control/protection: Condom Male, Emergency Contraception     Comment: Just got off hormonal birth control after 17 years   Other Topics Concern   • Not on file   Social History Narrative    Long term relationship    Dogs 2    Working      Social Determinants of Health     Financial Resource Strain: Not on file   Food Insecurity: Not on file   Transportation Needs: Not on file   Physical Activity: Not on file   Stress: Not on file   Social Connections: Not on file   Intimate Partner Violence: Not on file   Housing Stability: Not on file       Current Outpatient Medications:   •  ALPRAZolam (XANAX) 0.5 mg tablet, Take 0.5 mg by mouth daily as needed, Disp: , Rfl:   •  amphetamine-dextroamphetamine (ADDERALL, 10MG,) 10 mg tablet, , Disp: , Rfl:   Allergies   Allergen Reactions   • Pollen Extract Sneezing     sneezing, itchy eyes         Review of Systems   Constitutional:  Negative for appetite change and fatigue.   HENT:  Negative for congestion, ear pain and postnasal drip.    Eyes:  Negative for visual disturbance.   Respiratory:  Negative for cough and shortness of breath.    Cardiovascular:  Negative for chest pain and leg swelling.   Gastrointestinal:  Negative for abdominal pain, constipation and diarrhea.   Genitourinary:  Negative for dysuria, frequency and urgency.   Musculoskeletal:  Positive for neck pain and neck stiffness. Negative for arthralgias and myalgias.   Skin:  Negative for rash and wound.  "  Neurological:  Negative for dizziness, numbness and headaches.   Hematological:  Does not bruise/bleed easily.   Psychiatric/Behavioral:  Negative for confusion and sleep disturbance. The patient is not nervous/anxious.          Objective:      /78   Pulse 90   Temp (!) 96.9 °F (36.1 °C)   Ht 5' 6\" (1.676 m)   Wt 77.1 kg (170 lb)   SpO2 99%   BMI 27.44 kg/m²          Physical Exam  Vitals and nursing note reviewed.   Constitutional:       General: She is not in acute distress.     Appearance: She is well-developed.   HENT:      Head: Normocephalic and atraumatic.      Jaw: No tenderness.      Right Ear: Tympanic membrane, ear canal and external ear normal.      Left Ear: Tympanic membrane, ear canal and external ear normal.      Nose: Nose normal.      Mouth/Throat:      Mouth: Mucous membranes are moist.      Pharynx: Uvula midline.   Eyes:      Conjunctiva/sclera: Conjunctivae normal.      Pupils: Pupils are equal, round, and reactive to light.   Neck:      Thyroid: No thyroid mass.   Cardiovascular:      Rate and Rhythm: Normal rate and regular rhythm.      Heart sounds: Normal heart sounds.   Pulmonary:      Effort: Pulmonary effort is normal.      Breath sounds: Normal breath sounds. No wheezing or rhonchi.   Abdominal:      General: Bowel sounds are normal.      Palpations: Abdomen is soft.      Tenderness: There is no abdominal tenderness.      Hernia: No hernia is present.   Musculoskeletal:         General: No swelling. Normal range of motion.      Cervical back: Neck supple. Spasms present. No tenderness or bony tenderness. No pain with movement.      Thoracic back: Spasms present. No tenderness or bony tenderness.      Right lower leg: No edema.      Left lower leg: No edema.      Comments: No joint pain or swelling   Lymphadenopathy:      Cervical: No cervical adenopathy.      Upper Body:      Right upper body: No supraclavicular adenopathy.      Left upper body: No supraclavicular " adenopathy.   Skin:     General: Skin is warm.      Findings: No rash.   Neurological:      General: No focal deficit present.      Mental Status: She is alert and oriented to person, place, and time.      Cranial Nerves: No cranial nerve deficit.   Psychiatric:         Mood and Affect: Mood and affect normal. Mood is not anxious or depressed.         Behavior: Behavior normal.           Reviewed available records.  Labs & imaging results reviewed with patient.

## 2024-01-09 NOTE — ASSESSMENT & PLAN NOTE
Reviewed recent normal x-ray.  Went to physical therapy.  Recommend to apply warm moist heat followed by daily stretching exercises.  Revise work station, take breaks every hour.

## 2024-02-09 ENCOUNTER — APPOINTMENT (EMERGENCY)
Dept: RADIOLOGY | Facility: HOSPITAL | Age: 34
End: 2024-02-09
Payer: COMMERCIAL

## 2024-02-09 ENCOUNTER — APPOINTMENT (EMERGENCY)
Dept: ULTRASOUND IMAGING | Facility: HOSPITAL | Age: 34
End: 2024-02-09
Payer: COMMERCIAL

## 2024-02-09 ENCOUNTER — HOSPITAL ENCOUNTER (EMERGENCY)
Facility: HOSPITAL | Age: 34
Discharge: HOME | End: 2024-02-09
Attending: EMERGENCY MEDICINE
Payer: COMMERCIAL

## 2024-02-09 VITALS
HEART RATE: 87 BPM | OXYGEN SATURATION: 100 % | TEMPERATURE: 98.1 F | DIASTOLIC BLOOD PRESSURE: 81 MMHG | HEIGHT: 66 IN | WEIGHT: 169.31 LBS | SYSTOLIC BLOOD PRESSURE: 133 MMHG | RESPIRATION RATE: 20 BRPM | BODY MASS INDEX: 27.21 KG/M2

## 2024-02-09 DIAGNOSIS — R10.2 PELVIC PAIN DURING PREGNANCY: Primary | ICD-10-CM

## 2024-02-09 DIAGNOSIS — Z3A.01 LESS THAN 8 WEEKS GESTATION OF PREGNANCY: ICD-10-CM

## 2024-02-09 DIAGNOSIS — R07.9 CHEST PAIN: ICD-10-CM

## 2024-02-09 DIAGNOSIS — O26.899 PELVIC PAIN DURING PREGNANCY: Primary | ICD-10-CM

## 2024-02-09 DIAGNOSIS — R06.02 SOB (SHORTNESS OF BREATH): ICD-10-CM

## 2024-02-09 DIAGNOSIS — R10.9 ABDOMINAL PAIN: ICD-10-CM

## 2024-02-09 LAB
ALBUMIN SERPL BCP-MCNC: 4.2 G/DL (ref 3.5–5)
ALP SERPL-CCNC: 32 U/L (ref 34–104)
ALT SERPL W P-5'-P-CCNC: 4 U/L (ref 7–52)
ANION GAP SERPL CALCULATED.3IONS-SCNC: 8 MMOL/L
AST SERPL W P-5'-P-CCNC: 9 U/L (ref 13–39)
ATRIAL RATE: 103 BPM
B-HCG SERPL-ACNC: ABNORMAL MIU/ML (ref 0–5)
BASOPHILS # BLD AUTO: 0.03 THOUSANDS/ÂΜL (ref 0–0.1)
BASOPHILS NFR BLD AUTO: 1 % (ref 0–1)
BILIRUB SERPL-MCNC: 0.63 MG/DL (ref 0.2–1)
BILIRUB UR QL STRIP: NEGATIVE
BUN SERPL-MCNC: 10 MG/DL (ref 5–25)
CALCIUM SERPL-MCNC: 9 MG/DL (ref 8.4–10.2)
CARDIAC TROPONIN I PNL SERPL HS: <2 NG/L
CHLORIDE SERPL-SCNC: 106 MMOL/L (ref 96–108)
CLARITY UR: CLEAR
CO2 SERPL-SCNC: 21 MMOL/L (ref 21–32)
COLOR UR: COLORLESS
CREAT SERPL-MCNC: 0.74 MG/DL (ref 0.6–1.3)
D DIMER PPP FEU-MCNC: 0.3 UG/ML FEU
EOSINOPHIL # BLD AUTO: 0.07 THOUSAND/ÂΜL (ref 0–0.61)
EOSINOPHIL NFR BLD AUTO: 1 % (ref 0–6)
ERYTHROCYTE [DISTWIDTH] IN BLOOD BY AUTOMATED COUNT: 11.9 % (ref 11.6–15.1)
EXT PREGNANCY TEST URINE: POSITIVE
EXT. CONTROL: ABNORMAL
FLUAV RNA RESP QL NAA+PROBE: NEGATIVE
FLUBV RNA RESP QL NAA+PROBE: NEGATIVE
GFR SERPL CREATININE-BSD FRML MDRD: 106 ML/MIN/1.73SQ M
GLUCOSE SERPL-MCNC: 108 MG/DL (ref 65–140)
GLUCOSE UR STRIP-MCNC: NEGATIVE MG/DL
HCT VFR BLD AUTO: 36.5 % (ref 34.8–46.1)
HGB BLD-MCNC: 12.1 G/DL (ref 11.5–15.4)
HGB UR QL STRIP.AUTO: NEGATIVE
IMM GRANULOCYTES # BLD AUTO: 0.02 THOUSAND/UL (ref 0–0.2)
IMM GRANULOCYTES NFR BLD AUTO: 0 % (ref 0–2)
KETONES UR STRIP-MCNC: ABNORMAL MG/DL
LEUKOCYTE ESTERASE UR QL STRIP: NEGATIVE
LIPASE SERPL-CCNC: 18 U/L (ref 11–82)
LYMPHOCYTES # BLD AUTO: 1.29 THOUSANDS/ÂΜL (ref 0.6–4.47)
LYMPHOCYTES NFR BLD AUTO: 26 % (ref 14–44)
MCH RBC QN AUTO: 28.8 PG (ref 26.8–34.3)
MCHC RBC AUTO-ENTMCNC: 33.2 G/DL (ref 31.4–37.4)
MCV RBC AUTO: 87 FL (ref 82–98)
MONOCYTES # BLD AUTO: 0.23 THOUSAND/ÂΜL (ref 0.17–1.22)
MONOCYTES NFR BLD AUTO: 5 % (ref 4–12)
NEUTROPHILS # BLD AUTO: 3.24 THOUSANDS/ÂΜL (ref 1.85–7.62)
NEUTS SEG NFR BLD AUTO: 67 % (ref 43–75)
NITRITE UR QL STRIP: NEGATIVE
NRBC BLD AUTO-RTO: 0 /100 WBCS
P AXIS: 56 DEGREES
PH UR STRIP.AUTO: 5.5 [PH]
PLATELET # BLD AUTO: 200 THOUSANDS/UL (ref 149–390)
PMV BLD AUTO: 9.2 FL (ref 8.9–12.7)
POTASSIUM SERPL-SCNC: 3.6 MMOL/L (ref 3.5–5.3)
PR INTERVAL: 128 MS
PROT SERPL-MCNC: 6.7 G/DL (ref 6.4–8.4)
PROT UR STRIP-MCNC: NEGATIVE MG/DL
QRS AXIS: 85 DEGREES
QRSD INTERVAL: 84 MS
QT INTERVAL: 338 MS
QTC INTERVAL: 442 MS
RBC # BLD AUTO: 4.2 MILLION/UL (ref 3.81–5.12)
RSV RNA RESP QL NAA+PROBE: NEGATIVE
SARS-COV-2 RNA RESP QL NAA+PROBE: NEGATIVE
SODIUM SERPL-SCNC: 135 MMOL/L (ref 135–147)
SP GR UR STRIP.AUTO: 1.01 (ref 1–1.03)
T WAVE AXIS: 17 DEGREES
UROBILINOGEN UR STRIP-ACNC: <2 MG/DL
VENTRICULAR RATE: 103 BPM
WBC # BLD AUTO: 4.88 THOUSAND/UL (ref 4.31–10.16)

## 2024-02-09 PROCEDURE — 84702 CHORIONIC GONADOTROPIN TEST: CPT | Performed by: EMERGENCY MEDICINE

## 2024-02-09 PROCEDURE — 81003 URINALYSIS AUTO W/O SCOPE: CPT | Performed by: EMERGENCY MEDICINE

## 2024-02-09 PROCEDURE — 85025 COMPLETE CBC W/AUTO DIFF WBC: CPT | Performed by: EMERGENCY MEDICINE

## 2024-02-09 PROCEDURE — 36415 COLL VENOUS BLD VENIPUNCTURE: CPT | Performed by: EMERGENCY MEDICINE

## 2024-02-09 PROCEDURE — 83690 ASSAY OF LIPASE: CPT | Performed by: EMERGENCY MEDICINE

## 2024-02-09 PROCEDURE — 80053 COMPREHEN METABOLIC PANEL: CPT | Performed by: EMERGENCY MEDICINE

## 2024-02-09 PROCEDURE — 76801 OB US < 14 WKS SINGLE FETUS: CPT

## 2024-02-09 PROCEDURE — 85379 FIBRIN DEGRADATION QUANT: CPT | Performed by: EMERGENCY MEDICINE

## 2024-02-09 PROCEDURE — 99285 EMERGENCY DEPT VISIT HI MDM: CPT

## 2024-02-09 PROCEDURE — 99285 EMERGENCY DEPT VISIT HI MDM: CPT | Performed by: EMERGENCY MEDICINE

## 2024-02-09 PROCEDURE — 0241U HB NFCT DS VIR RESP RNA 4 TRGT: CPT | Performed by: EMERGENCY MEDICINE

## 2024-02-09 PROCEDURE — 84484 ASSAY OF TROPONIN QUANT: CPT | Performed by: EMERGENCY MEDICINE

## 2024-02-09 PROCEDURE — 93005 ELECTROCARDIOGRAM TRACING: CPT

## 2024-02-09 PROCEDURE — 81025 URINE PREGNANCY TEST: CPT | Performed by: EMERGENCY MEDICINE

## 2024-02-09 RX ORDER — ACETAMINOPHEN 325 MG/1
650 TABLET ORAL ONCE
Status: COMPLETED | OUTPATIENT
Start: 2024-02-09 | End: 2024-02-09

## 2024-02-09 RX ADMIN — ACETAMINOPHEN 650 MG: 325 TABLET, FILM COATED ORAL at 15:33

## 2024-02-09 NOTE — ED PROVIDER NOTES
History  Chief Complaint   Patient presents with    Chest Pain    Abdominal Pain     Pt c/o abdominal pain radiating through L armpit & intermittent sharp pains throughout chest. Pt c/o generalized illness flu/covid-like symptoms since early January      33-year-old female with history of ADHD and anxiety presents to the ED with complaints of mild intermittent left lower quadrant/pelvic pain radiating to her chest, upper left thigh, back and left arm associated with shortness of breath for the past 2 days.  Patient reports pain/discomfort onsets when getting up from sitting, however does not change with movement when she is up.  She denies any exertional component to it.  Reports intermittent mild nausea, without vomiting, diarrhea, fever, chills, or bloody stools.  She does also report dysuria, but denies any frequency, urgency, or hematuria.  Denies recent sick contacts.  Denies prior history of DVT/PE, but reports that her father had history of blood clots and is worried about that today.  States that she just feels overall weak, but has been tolerating p.o. intake.  Denies headache, visual changes, cough, sore throat, lower leg pain, lower leg swelling.  Denies recent immobilizations, surgery, or history of malignancy.  States that she recently quit vaping.  States that she had a prior history of ruptured cyst, and wonders if her pain could be related to this as well.  Prior history of appendectomy.        Prior to Admission Medications   Prescriptions Last Dose Informant Patient Reported? Taking?   ALPRAZolam (XANAX) 0.5 mg tablet   Yes No   Sig: Take 0.5 mg by mouth daily as needed   amphetamine-dextroamphetamine (ADDERALL, 10MG,) 10 mg tablet   Yes No      Facility-Administered Medications: None       Past Medical History:   Diagnosis Date    Acne     Allergic     Concussion 2014    Fell in man hole    Depression 2004    Eczema     Headache(784.0)     Skin tag        Past Surgical History:   Procedure  Laterality Date    APPENDECTOMY  2014    SKIN BIOPSY  2020       Family History   Problem Relation Age of Onset    Arthritis Mother     Eczema Father         Only around hands and elbows    Squamous cell carcinoma Father     Acne Brother     Colon cancer Maternal Grandmother     Hypertension Maternal Grandmother     Stroke Maternal Grandmother     Lung cancer Maternal Grandfather     Dementia Paternal Grandmother      I have reviewed and agree with the history as documented.    E-Cigarette/Vaping    E-Cigarette Use Current Some Day User      E-Cigarette/Vaping Substances    Nicotine Yes     THC No     CBD No     Flavoring No     Other No     Unknown No      Social History     Tobacco Use    Smoking status: Former     Current packs/day: 0.00     Average packs/day: 0.3 packs/day for 16.5 years (4.1 ttl pk-yrs)     Types: Cigarettes     Start date: 9/10/2005     Quit date: 3/1/2022     Years since quittin.9    Smokeless tobacco: Never    Tobacco comments:     Still occasionally smoke Juul (vape)   Vaping Use    Vaping status: Some Days    Substances: Nicotine   Substance Use Topics    Alcohol use: Yes     Alcohol/week: 15.0 standard drinks of alcohol     Types: 3 Glasses of wine, 12 Cans of beer per week     Comment: On weekends- binge    Drug use: Never        Review of Systems   Constitutional:  Negative for chills and fever.   HENT:  Positive for congestion. Negative for sore throat.    Eyes:  Negative for photophobia, pain, redness and visual disturbance.   Respiratory:  Positive for shortness of breath. Negative for cough and chest tightness.    Cardiovascular:  Negative for chest pain, palpitations and leg swelling.   Gastrointestinal:  Positive for abdominal pain and nausea. Negative for blood in stool, constipation, diarrhea and vomiting.   Genitourinary:  Positive for dysuria and pelvic pain. Negative for difficulty urinating, flank pain, frequency, hematuria, urgency, vaginal bleeding,  vaginal discharge and vaginal pain.   Musculoskeletal:  Positive for back pain. Negative for arthralgias, neck pain and neck stiffness.   Skin:  Negative for color change, rash and wound.   Neurological:  Negative for dizziness, tremors, seizures, syncope, facial asymmetry, speech difficulty, weakness, light-headedness, numbness and headaches.   All other systems reviewed and are negative.      Physical Exam  ED Triage Vitals [02/09/24 1504]   Temperature Pulse Respirations Blood Pressure SpO2   98.1 °F (36.7 °C) (!) 110 20 137/70 99 %      Temp Source Heart Rate Source Patient Position - Orthostatic VS BP Location FiO2 (%)   Oral Monitor Lying Left arm --      Pain Score       3             Orthostatic Vital Signs  Vitals:    02/09/24 1504 02/09/24 1522 02/09/24 1624 02/09/24 1750   BP: 137/70 118/72 121/69 133/81   Pulse: (!) 110 93 90 87   Patient Position - Orthostatic VS: Lying Lying Lying Lying       Physical Exam  Vitals and nursing note reviewed.   Constitutional:       General: She is not in acute distress.     Appearance: She is well-developed and normal weight. She is not ill-appearing or toxic-appearing.   HENT:      Head: Normocephalic and atraumatic.      Right Ear: External ear normal.      Left Ear: External ear normal.      Nose: Nose normal. No congestion.      Mouth/Throat:      Mouth: Mucous membranes are moist.      Pharynx: Oropharynx is clear. No oropharyngeal exudate or posterior oropharyngeal erythema.   Eyes:      Extraocular Movements: Extraocular movements intact.      Conjunctiva/sclera: Conjunctivae normal.      Pupils: Pupils are equal, round, and reactive to light.   Cardiovascular:      Rate and Rhythm: Normal rate and regular rhythm.      Pulses: Normal pulses.      Heart sounds: Normal heart sounds. No murmur heard.  Pulmonary:      Effort: Pulmonary effort is normal. No respiratory distress.      Breath sounds: Normal breath sounds. No stridor. No wheezing, rhonchi or rales.    Chest:      Chest wall: No deformity, tenderness or crepitus.   Abdominal:      General: Bowel sounds are normal.      Palpations: Abdomen is soft.      Tenderness: There is abdominal tenderness (Mild) in the suprapubic area and left lower quadrant. There is no right CVA tenderness, left CVA tenderness, guarding or rebound.   Musculoskeletal:         General: No swelling, tenderness or deformity.      Cervical back: Normal range of motion and neck supple. No rigidity or tenderness.      Right lower leg: No edema.      Left lower leg: No edema.   Lymphadenopathy:      Cervical: No cervical adenopathy.   Skin:     General: Skin is warm and dry.      Capillary Refill: Capillary refill takes less than 2 seconds.      Coloration: Skin is not jaundiced or pale.      Findings: No bruising, erythema, lesion or rash.   Neurological:      General: No focal deficit present.      Mental Status: She is alert and oriented to person, place, and time. Mental status is at baseline.      GCS: GCS eye subscore is 4. GCS verbal subscore is 5. GCS motor subscore is 6.      Cranial Nerves: Cranial nerves 2-12 are intact. No cranial nerve deficit, dysarthria or facial asymmetry.      Sensory: Sensation is intact. No sensory deficit.      Motor: Motor function is intact. No weakness.      Coordination: Coordination is intact. Coordination normal.      Gait: Gait is intact.   Psychiatric:         Mood and Affect: Mood normal.         Behavior: Behavior normal.         Thought Content: Thought content normal.         ED Medications  Medications   acetaminophen (TYLENOL) tablet 650 mg (650 mg Oral Given 2/9/24 1533)       Diagnostic Studies  Results Reviewed       Procedure Component Value Units Date/Time    hCG, quantitative [[id number]]  (Abnormal) Collected: 02/09/24 1526    Lab Status: Final result Specimen: Blood from Arm, Left Updated: 02/09/24 1724     HCG, Quant 16,339 mIU/mL     Narrative:       Expected Ranges:    HCG results  between 5 and 25 mIU/mL may be indicative of early pregnancy but should be interpreted in light of the total clinical presentation.    HCG can rise to detectable levels in james and post menopausal women (0-11.6 mIU/mL).     Approximate               Approximate HCG  Gestation age          Concentration ( mIU/mL)  _____________          ______________________   Weeks                      HCG values  0.2-1                       5-50  1-2                           2-3                         100-5000  3-4                         500-99890  4-5                         1000-16260  5-6                         10186-204869  6-8                         37108-172669  8-12                        [id number]      COVID/FLU/RSV [[id number]]  (Normal) Collected: 02/09/24 1526    Lab Status: Final result Specimen: Nares from Nose Updated: 02/09/24 1620     SARS-CoV-2 Negative     INFLUENZA A PCR Negative     INFLUENZA B PCR Negative     RSV PCR Negative    Narrative:      FOR PEDIATRIC PATIENTS - copy/paste COVID Guidelines URL to browser: [email or web address]    SARS-CoV-2 assay is a Nucleic Acid Amplification assay intended for the  qualitative detection of nucleic acid from SARS-CoV-2 in nasopharyngeal  swabs. Results are for the presumptive identification of SARS-CoV-2 RNA.    Positive results are indicative of infection with SARS-CoV-2, the virus  causing COVID-19, but do not rule out bacterial infection or co-infection  with other viruses. Laboratories within the [place] and its  territories are required to report all positive results to the appropriate  public health authorities. Negative results do not preclude SARS-CoV-2  infection and should not be used as the sole basis for treatment or other  patient management decisions. Negative results must be combined with  clinical observations, patient history, and epidemiological information.  This test has not  been FDA cleared or approved.    This test has been authorized by FDA under an Emergency Use Authorization  (EUA). This test is only authorized for the duration of time the  declaration that circumstances exist justifying the authorization of the  emergency use of an in vitro diagnostic tests for detection of SARS-CoV-2  virus and/or diagnosis of COVID-19 infection under section 564(b)(1) of  the Act, 21 U.S.C. 360bbb-3(b)(1), unless the authorization is terminated  or revoked sooner. The test has been validated but independent review by FDA  and CLIA is pending.    Test performed using [employer] GeneXpert: This RT-PCR assay targets N2,  a region unique to SARS-CoV-2. A conserved region in the E-gene was chosen  for pan-Sarbecovirus detection which includes SARS-CoV-2.    According to CMS-2020-01-R, this platform meets the definition of high-throughput technology.    HS Troponin 0hr (reflex protocol) [[id number]]  (Normal) Collected: 02/09/24 1526    Lab Status: Final result Specimen: Blood from Arm, Left Updated: 02/09/24 1601     hs TnI 0hr <2 ng/L     D-dimer, quantitative [[id number]]  (Normal) Collected: 02/09/24 1526    Lab Status: Final result Specimen: Blood from Arm, Left Updated: 02/09/24 1554     D-Dimer, Quant 0.30 ug/ml FEU     Comprehensive metabolic panel [[id number]]  (Abnormal) Collected: 02/09/24 1526    Lab Status: Final result Specimen: Blood from Arm, Left Updated: 02/09/24 1554     Sodium 135 mmol/L      Potassium 3.6 mmol/L      Chloride 106 mmol/L      CO2 21 mmol/L      ANION GAP 8 mmol/L      BUN 10 mg/dL      Creatinine 0.74 mg/dL      Glucose 108 mg/dL      Calcium 9.0 mg/dL      AST 9 U/L      ALT 4 U/L      Alkaline Phosphatase 32 U/L      Total Protein 6.7 g/dL      Albumin 4.2 g/dL      Total Bilirubin 0.63 mg/dL      eGFR 106 ml/min/1.73sq m     Narrative:      National Kidney Disease Foundation guidelines for Chronic Kidney Disease (CKD):     Stage 1 with normal or high GFR (GFR > 90  mL/min/1.73 square meters)    Stage 2 Mild CKD (GFR = 60-89 mL/min/1.73 square meters)    Stage 3A Moderate CKD (GFR = 45-59 mL/min/1.73 square meters)    Stage 3B Moderate CKD (GFR = 30-44 mL/min/1.73 square meters)    Stage 4 Severe CKD (GFR = 15-29 mL/min/1.73 square meters)    Stage 5 End Stage CKD (GFR <15 mL/min/1.73 square meters)  Note: GFR calculation is accurate only with a steady state creatinine    Lipase [[id number]]  (Normal) Collected: 02/09/24 1526    Lab Status: Final result Specimen: Blood from Arm, Left Updated: 02/09/24 1554     Lipase 18 u/L     CBC and differential [[id number]] Collected: 02/09/24 1526    Lab Status: Final result Specimen: Blood from Arm, Left Updated: 02/09/24 1544     WBC 4.88 Thousand/uL      RBC 4.20 Million/uL      Hemoglobin 12.1 g/dL      Hematocrit 36.5 %      MCV 87 fL      MCH 28.8 pg      MCHC 33.2 g/dL      RDW 11.9 %      MPV 9.2 fL      Platelets 200 Thousands/uL      nRBC 0 /100 WBCs      Neutrophils Relative 67 %      Immat GRANS % 0 %      Lymphocytes Relative 26 %      Monocytes Relative 5 %      Eosinophils Relative 1 %      Basophils Relative 1 %      Neutrophils Absolute 3.24 Thousands/µL      Immature Grans Absolute 0.02 Thousand/uL      Lymphocytes Absolute 1.29 Thousands/µL      Monocytes Absolute 0.23 Thousand/µL      Eosinophils Absolute 0.07 Thousand/µL      Basophils Absolute 0.03 Thousands/µL     UA w Reflex to Microscopic w Reflex to Culture [[id number]]  (Abnormal) Collected: 02/09/24 1535    Lab Status: Final result Specimen: Urine, Clean Catch Updated: 02/09/24 1543     Color, UA Colorless     Clarity, UA Clear     Specific Gravity, UA 1.006     pH, UA 5.5     Leukocytes, UA Negative     Nitrite, UA Negative     Protein, UA Negative mg/dl      Glucose, UA Negative mg/dl      Ketones, UA 10 (1+) mg/dl      Urobilinogen, UA <2.0 mg/dl      Bilirubin, UA Negative     Occult Blood, UA Negative    POCT pregnancy, urine [[id number]]  (Abnormal) Resulted:  02/09/24 1538    Lab Status: Final result Updated: 02/09/24 1538     EXT Preg Test, Ur Positive     Control Valid                   US OB < 14 weeks with transvaginal   Final Result by Karl Silva MD (02/09 1744)      Early intrauterine pregnancy. A fetal pole is not yet apparent.      Workstation performed: [id number]               Procedures  ECG 12 Lead Documentation Only    Date/Time: 2/9/2024 3:27 PM    Performed by: Sandeep Dai DO  Authorized by: Sandeep Dai DO    Patient location:  ED  Previous ECG:     Previous ECG:  Unavailable  Interpretation:     Interpretation: abnormal    Rate:     ECG rate:  103    ECG rate assessment: tachycardic    Rhythm:     Rhythm: sinus tachycardia    Ectopy:     Ectopy: none    QRS:     QRS axis:  Normal  Conduction:     Conduction: normal    ST segments:     ST segments:  Normal  T waves:     T waves: normal          ED Course  ED Course as of 02/09/24 1800   Fri Feb 09, 2024   1525 Pulse(!): 110   1525 Pulse: 93   1526 Independently interpreted patient's EKG showing sinus tachycardia at 103.  Normal axis.  No acute ischemic changes.   1543 POCT pregnancy, urine(!)  Positive   1544 CBC and differential  No leukocytosis.  No anemia, hemoglobin 12.1.  Platelets WNL.   1544 UA w Reflex to Microscopic w Reflex to Culture(!)  No UTI or hematuria.   1544 Discussed with patient given new finding of positive pregnancy test, last menstrual period was last week at December.  Patient did not get her period this month however is due for it.  D-dimer pending at this time, discussed possibility of elevated D-dimer given positive pregnancy result.  Patient would like a few minutes to process the positive pregnancy and will discuss risk versus benefits if D-dimer is positive for CT PE study.  hCG quant ordered.   1555 Lipase: 18  WNL   1556 D-Dimer, Quant: 0.30  WNL, no indication for further imaging with CT PE study.  Tachycardia resolved.   1556 Comprehensive metabolic  panel(!)  No acute electrolyte abnormality.  Renal function at baseline.  LFTs not elevated.   1601 hs TnI 0hr: <2  Unlikely ACS as symptoms ongoing since Wednesday.  EKG nonischemic.  Will cancel 24-hour troponin.   1621 Patient with clear lungs bilaterally, regular rate and rhythm on exam (tachycardia resolved) troponin less than 2, EKG without arrhythmia or ischemic changes.  Hemodynamically stable, low suspicion for ACS, vascular catastrophe or pneumonia at this time.  Will defer chest x-ray given radiation risk after shared decision-making with patient as she is newly pregnant.   1622 COVID/FLU/RSV  Negative.   1734 HCG QUANTITATIVE(!): 16,339   1747 US OB < 14 weeks with transvaginal  Early gestational sac is visualized with a yolk sac. A fetal pole is not yet apparent.     Gestational sac size is too small to assess dates.     UTERUS/ADNEXA:  The uterus and ovaries are within normal limits.  The cervix remains closed.  No free fluid present.     1747 Reviewed all results with patient.  Workup negative for acute cardiopulmonary process at this time.  Patient with positive pregnancy test, hCG 16,000, with ultrasound showing early gestational pregnancy that is too early to date.  Will have patient follow-up with her OB/GYN as an outpatient for further evaluation and management.  Patient otherwise stable for discharge.  Reviewed strict return precautions along with care instructions at home for her pain and she is agreeable with plan.             HEART Risk Score      Flowsheet Row Most Recent Value   Heart Score Risk Calculator    History 0 Filed at: 02/09/2024 1743   ECG 0 Filed at: 02/09/2024 1743   Age 0 Filed at: 02/09/2024 1743   Risk Factors 0 Filed at: 02/09/2024 1743   Troponin 0 Filed at: 02/09/2024 1743   HEART Score 0 Filed at: 02/09/2024 1743                PERC Rule for PE      Flowsheet Row Most Recent Value   PERC Rule for PE    Age >=50 0 Filed at: 02/09/2024 1526   HR >=100 1 Filed at:  "02/09/2024 1526   O2 Sat on room air < 95% 0 Filed at: 02/09/2024 1526   History of PE or DVT 0 Filed at: 02/09/2024 1526   Recent trauma or surgery 0 Filed at: 02/09/2024 1526   Hemoptysis 0 Filed at: 02/09/2024 1526   Exogenous estrogen 0 Filed at: 02/09/2024 1526   Unilateral leg swelling 0 Filed at: 02/09/2024 1526   PERC Rule for PE Results 1 Filed at: 02/09/2024 1526                SBIRT 22yo+      Flowsheet Row Most Recent Value   Initial Alcohol Screen: US AUDIT-C     1. How often do you have a drink containing alcohol? 0 Filed at: 02/09/2024 1507   2. How many drinks containing alcohol do you have on a typical day you are drinking?  0 Filed at: 02/09/2024 1507   3b. FEMALE Any Age, or MALE 65+: How often do you have 4 or more drinks on one occassion? 0 Filed at: 02/09/2024 1507   Audit-C Score 0 Filed at: 02/09/2024 1507   KARLOS: How many times in the past year have you...    Used an illegal drug or used a prescription medication for non-medical reasons? Never Filed at: 02/09/2024 1507            Wells' Criteria for PE      Flowsheet Row Most Recent Value   Wells' Criteria for PE    Clinical signs and symptoms of DVT 0 Filed at: 02/09/2024 1525   PE is primary diagnosis or equally likely 0 Filed at: 02/09/2024 1525   HR >100 1.5 Filed at: 02/09/2024 1525   Immobilization at least 3 days or Surgery in the previous 4 weeks 0 Filed at: 02/09/2024 1525   Previous, objectively diagnosed PE or DVT 0 Filed at: 02/09/2024 1525   Hemoptysis 0 Filed at: 02/09/2024 1525   Malignancy with treatment within 6 months or palliative 0 Filed at: 02/09/2024 1525   Wells' Criteria Total 1.5 Filed at: 02/09/2024 1525              Medical Decision Making  Patient with history as above presented to triage with CC of \" Patient presents with:  Chest Pain  Abdominal Pain: Pt c/o abdominal pain radiating through L armpit & intermittent sharp pains throughout chest. Pt c/o generalized illness flu/covid-like symptoms since early January " "   \"    Hx obtained from pt    33-year-old female no significant past medical history presenting with nonspecific complaints of chest pain, shortness of breath and abdominal pain. Chest pain, with symptoms suggestive of noncardiac chest pain. History without high risk features (e.g., not substernal, no exertional component, not relieved with rest).    Minimal CAD risk factors (including age), Exam without evidence of volume overload. EKG without signs of active ischemia. HEART score: 0. Given the timing of pain to ER presentation, plan to send single troponin to evaluate for NSTEMI. Presentation not consistent with acute PE (will evaluate with D-dimer as she is unable to PERC out given initial tachycardia, Wells low risk).presentation not consistent with pneumothorax, thoracic arotic dissection, cardiac effusion or tamponade.    Abdominal exam with LLQ pain but without peritoneal signs. No evidence of acute abdomen at this time. Otherwise well appearing.  Differential diagnosis includes viral gastroenteritis, ruptured ovarian cyst, pregnancy, low suspicion for acute hepatobiliary disease (includng acute cholecystitis), acute pancreatitis, PUD (including perforation), acute infectious processes (pneumonia, hepatitis, pyelonephritis), acute appendicitis, vascular catastrophe, bowel obstruction or viscus perforation. Presentation not consistent with other acute, emergent causes of abdominal pain at this time such as ectopic pregnancy, tubo-ovarian abscess.    Plan: labs, troponin, EKG, UA, urine pride, transvaginal ultrasound, pain control, serial reassessment    Labs significant for hCG quant of 16,000.  D-dimer and troponin negative.  EKG without ischemic changes.  Ultrasound negative for acute emergent pathology, showing gestational sac with a yolk sac but no fetal pole.  Too early today.  No ectopic.  Patient otherwise stable for discharge with outpatient follow-up with her OB and PMD.  Reviewed care instructions " at home, and advised on smoking and alcohol cessation along with taking prenatal vitamins.  Reviewed strict return precautions with patient and she is agreeable with discharge plan.      Patient was nontoxic appearing and stable. Exam as above. Ambulatory and Tolerating PO.     I have independently ordered, reviewed and interpreted the following: labs and/or imaging studies listed above, EKG above  Reviewed external records including notes, and prior labs/imaging results.     Consideration was given for admission, but the patient was stable for outpatient management.    Disposition: Discussed need for follow up with their primary doctor or specialist to review all results, including incidental findings as above. Patient discharged with explanation of ED workup and diagnosis, instructions on how to obtain outpatient follow up, care instructions at home, and strict return precautions if patient develops new or worsening symptoms. Patients questions answered and agreeable with discharge plan.     See ED Course for further MDM.      PLEASE NOTE:  This encounter was completed utilizing the [employer] Speech Voice Recognition Software. Grammatical errors, random word insertions, pronoun errors and incomplete sentences are occasional inherent consequences of the system due to software limitations, ambient noise and hardware issues.These may be missed by proof reading prior to affixing electronic signature. Any questions or concerns about the content, text or information contained within the body of this dictation should be directly addressed to the physician for clarification. Please do not hesitate to call me directly if you have any questions or concerns.      Amount and/or Complexity of Data Reviewed  External Data Reviewed: notes.  Labs: ordered. Decision-making details documented in ED Course.  Radiology: ordered. Decision-making details documented in ED Course.  ECG/medicine tests: ordered and  independent interpretation performed. Decision-making details documented in ED Course.    Risk  OTC drugs.          Disposition  Final diagnoses:   Less than 8 weeks gestation of pregnancy   Chest pain   Abdominal pain   SOB (shortness of breath)     Time reflects when diagnosis was documented in both MDM as applicable and the Disposition within this note       Time User Action Codes Description Comment    2/9/2024  3:51 PM Anna Byrd Add [O26.899,  R10.2] Pelvic pain during pregnancy     2/9/2024  5:48 PM Sandeep Dai Add [Z3A.01] Less than 8 weeks gestation of pregnancy     2/9/2024  5:49 PM AhSandeep berry Add [R07.9] Chest pain     2/9/2024  5:49 PM MarcemedSandeep Add [R10.9] Abdominal pain     2/9/2024  5:49 PM Phylicia Daiza Add [R06.02] SOB (shortness of breath)           ED Disposition       ED Disposition   Discharge    Condition   Stable    Date/Time   Fri Feb 9, 2024 1748    Comment   Leida Jean discharge to home/self care.                   Follow-up Information       Follow up With Specialties Details Why Contact Info    Carlotta Barone MD Internal Medicine  As needed [address] [facility]  [address]  [place] [address]  [phone]      [facility] obstetrics and gynecology-medicine departments   Please call tomorrow to schedule an appointment [address] [place] [address]. [address]  MARCIA Patricia [id number] [phone]            Discharge Medication List as of 2/9/2024  5:51 PM        CONTINUE these medications which have NOT CHANGED    Details   ALPRAZolam (XANAX) 0.5 mg tablet Take 0.5 mg by mouth daily as needed, Starting Sat 9/5/2015, Historical Med      amphetamine-dextroamphetamine (ADDERALL, 10MG,) 10 mg tablet Starting Mon 6/15/2020, Historical Med           No discharge procedures on file.    PDMP Review       None             ED Provider  Attending physically available and evaluated Leida Jean. I managed the patient along with the ED Attending.    Electronically Signed by           Sandeep  DO Malaika  02/09/24 1804

## 2024-02-09 NOTE — ED ATTENDING ATTESTATION
2/9/2024  I, Abel Reinoso MD, saw and evaluated the patient. I have discussed the patient with the resident/non-physician practitioner and agree with the resident's/non-physician practitioner's findings, Plan of Care, and MDM as documented in the resident's/non-physician practitioner's note, except where noted. All available labs and Radiology studies were reviewed.  I was present for key portions of any procedure(s) performed by the resident/non-physician practitioner and I was immediately available to provide assistance.       At this point I agree with the current assessment done in the Emergency Department.  I have conducted an independent evaluation of this patient a history and physical is as follows:    Left lower quadrant abdominal pain, left shoulder pain, left-sided chest wall pain.  EKG, labs unremarkable except for positive pregnancy test, elevated hCG quantitative consistent with pregnancy.  Ultrasound to evaluate for ectopic pregnancy reveals single intrauterine pregnancy, with no apparent fetal pole.  This is patient's first pregnancy.  She has not established OB/GYN with whom she may follow-up.    Results Reviewed       Procedure Component Value Units Date/Time    hCG, quantitative [[id number]]  (Abnormal) Collected: 02/09/24 1526    Lab Status: Final result Specimen: Blood from Arm, Left Updated: 02/09/24 1724     HCG, Quant 16,339 mIU/mL     Narrative:       Expected Ranges:    HCG results between 5 and 25 mIU/mL may be indicative of early pregnancy but should be interpreted in light of the total clinical presentation.    HCG can rise to detectable levels in james and post menopausal women (0-11.6 mIU/mL).     Approximate               Approximate HCG  Gestation age          Concentration ( mIU/mL)  _____________          ______________________   Weeks                      HCG values  0.2-1                       5-50  1-2                           2-3                          100-5000  3-4                         500-55716  4-5                         1000-75331  5-6                         [id number]  6-8                         [id number]  8-12                        [id number]      COVID/FLU/RSV [[id number]]  (Normal) Collected: 02/09/24 1526    Lab Status: Final result Specimen: Nares from Nose Updated: 02/09/24 1620     SARS-CoV-2 Negative     INFLUENZA A PCR Negative     INFLUENZA B PCR Negative     RSV PCR Negative    Narrative:      FOR PEDIATRIC PATIENTS - copy/paste COVID Guidelines URL to browser: [email or web address]    SARS-CoV-2 assay is a Nucleic Acid Amplification assay intended for the  qualitative detection of nucleic acid from SARS-CoV-2 in nasopharyngeal  swabs. Results are for the presumptive identification of SARS-CoV-2 RNA.    Positive results are indicative of infection with SARS-CoV-2, the virus  causing COVID-19, but do not rule out bacterial infection or co-infection  with other viruses. Laboratories within the [place] and its  territories are required to report all positive results to the appropriate  public health authorities. Negative results do not preclude SARS-CoV-2  infection and should not be used as the sole basis for treatment or other  patient management decisions. Negative results must be combined with  clinical observations, patient history, and epidemiological information.  This test has not been FDA cleared or approved.    This test has been authorized by FDA under an Emergency Use Authorization  (EUA). This test is only authorized for the duration of time the  declaration that circumstances exist justifying the authorization of the  emergency use of an in vitro diagnostic tests for detection of SARS-CoV-2  virus and/or diagnosis of COVID-19 infection under section 564(b)(1) of  the Act, 21 U.S.C. 360bbb-3(b)(1), unless the authorization is terminated  or revoked sooner. The test  has been validated but independent review by FDA  and CLIA is pending.    Test performed using [employer] GeneXpert: This RT-PCR assay targets N2,  a region unique to SARS-CoV-2. A conserved region in the E-gene was chosen  for pan-Sarbecovirus detection which includes SARS-CoV-2.    According to CMS-2020-01-R, this platform meets the definition of high-throughput technology.    HS Troponin 0hr (reflex protocol) [[id number]]  (Normal) Collected: 02/09/24 1526    Lab Status: Final result Specimen: Blood from Arm, Left Updated: 02/09/24 1601     hs TnI 0hr <2 ng/L     D-dimer, quantitative [[id number]]  (Normal) Collected: 02/09/24 1526    Lab Status: Final result Specimen: Blood from Arm, Left Updated: 02/09/24 1554     D-Dimer, Quant 0.30 ug/ml FEU     Comprehensive metabolic panel [[id number]]  (Abnormal) Collected: 02/09/24 1526    Lab Status: Final result Specimen: Blood from Arm, Left Updated: 02/09/24 1554     Sodium 135 mmol/L      Potassium 3.6 mmol/L      Chloride 106 mmol/L      CO2 21 mmol/L      ANION GAP 8 mmol/L      BUN 10 mg/dL      Creatinine 0.74 mg/dL      Glucose 108 mg/dL      Calcium 9.0 mg/dL      AST 9 U/L      ALT 4 U/L      Alkaline Phosphatase 32 U/L      Total Protein 6.7 g/dL      Albumin 4.2 g/dL      Total Bilirubin 0.63 mg/dL      eGFR 106 ml/min/1.73sq m     Narrative:      National Kidney Disease Foundation guidelines for Chronic Kidney Disease (CKD):     Stage 1 with normal or high GFR (GFR > 90 mL/min/1.73 square meters)    Stage 2 Mild CKD (GFR = 60-89 mL/min/1.73 square meters)    Stage 3A Moderate CKD (GFR = 45-59 mL/min/1.73 square meters)    Stage 3B Moderate CKD (GFR = 30-44 mL/min/1.73 square meters)    Stage 4 Severe CKD (GFR = 15-29 mL/min/1.73 square meters)    Stage 5 End Stage CKD (GFR <15 mL/min/1.73 square meters)  Note: GFR calculation is accurate only with a steady state creatinine    Lipase [[id number]]  (Normal) Collected: 02/09/24 1526    Lab Status: Final result  Specimen: Blood from Arm, Left Updated: 02/09/24 1554     Lipase 18 u/L     CBC and differential [[id number]] Collected: 02/09/24 1526    Lab Status: Final result Specimen: Blood from Arm, Left Updated: 02/09/24 1544     WBC 4.88 Thousand/uL      RBC 4.20 Million/uL      Hemoglobin 12.1 g/dL      Hematocrit 36.5 %      MCV 87 fL      MCH 28.8 pg      MCHC 33.2 g/dL      RDW 11.9 %      MPV 9.2 fL      Platelets 200 Thousands/uL      nRBC 0 /100 WBCs      Neutrophils Relative 67 %      Immat GRANS % 0 %      Lymphocytes Relative 26 %      Monocytes Relative 5 %      Eosinophils Relative 1 %      Basophils Relative 1 %      Neutrophils Absolute 3.24 Thousands/µL      Immature Grans Absolute 0.02 Thousand/uL      Lymphocytes Absolute 1.29 Thousands/µL      Monocytes Absolute 0.23 Thousand/µL      Eosinophils Absolute 0.07 Thousand/µL      Basophils Absolute 0.03 Thousands/µL     UA w Reflex to Microscopic w Reflex to Culture [[id number]]  (Abnormal) Collected: 02/09/24 1535    Lab Status: Final result Specimen: Urine, Clean Catch Updated: 02/09/24 1543     Color, UA Colorless     Clarity, UA Clear     Specific Gravity, UA 1.006     pH, UA 5.5     Leukocytes, UA Negative     Nitrite, UA Negative     Protein, UA Negative mg/dl      Glucose, UA Negative mg/dl      Ketones, UA 10 (1+) mg/dl      Urobilinogen, UA <2.0 mg/dl      Bilirubin, UA Negative     Occult Blood, UA Negative    POCT pregnancy, urine [[id number]]  (Abnormal) Resulted: 02/09/24 1538    Lab Status: Final result Updated: 02/09/24 1538     EXT Preg Test, Ur Positive     Control Valid          US OB < 14 weeks with transvaginal   Final Result by Karl Silva MD (02/09 1744)      Early intrauterine pregnancy. A fetal pole is not yet apparent.      Workstation performed: [id number]               ED Course         Critical Care Time  Procedures

## 2024-02-09 NOTE — DISCHARGE INSTRUCTIONS
Today you were seen in the emergency department for chest pain, trouble breathing, abdominal pain. Your workup included labs, pregnancy testing, ultrasound. I believe your symptoms to be the result of early pregnancy. At this time there does not appear to be an emergent life threatening cause to explain your symptoms. You are stable for discharge home with outpatient follow up.     You may continue to take Tylenol as needed for pain.  Please also start taking prenatal vitamins you can buy these over-the-counter.    Please follow up with your OB/GYN at the next available appointment.  Please review all results discussed today with your primary care provider.     Please return to the emergency department as soon as possible if you develop uncontrollable fevers (Temp >100.4), uncontrollable pain, vaginal bleeding, vomiting, chest pain, trouble breathing, or any other concerning symptoms.     Thank you for choosing [facility] for your care.

## 2024-06-14 ENCOUNTER — OFFICE VISIT (OUTPATIENT)
Dept: URGENT CARE | Age: 34
End: 2024-06-14
Payer: COMMERCIAL

## 2024-06-14 VITALS
RESPIRATION RATE: 16 BRPM | SYSTOLIC BLOOD PRESSURE: 118 MMHG | OXYGEN SATURATION: 99 % | TEMPERATURE: 97.8 F | HEART RATE: 74 BPM | DIASTOLIC BLOOD PRESSURE: 76 MMHG

## 2024-06-14 DIAGNOSIS — S93.621A LISFRANC'S SPRAIN, RIGHT, INITIAL ENCOUNTER: Primary | ICD-10-CM

## 2024-06-14 PROCEDURE — G0383 LEV 4 HOSP TYPE B ED VISIT: HCPCS | Performed by: PHYSICIAN ASSISTANT

## 2024-06-14 NOTE — PROGRESS NOTES
[facility]        NAME: Leida Jean is a 33 y.o. female  : 1990    MRN: [id number]  DATE: 2024  TIME: 12:55 PM    Assessment and Plan   Lisfranc's sprain, right, initial encounter [S93.621A]  1. Lisfranc's sprain, right, initial encounter  XR foot 3+ vw right    XR ankle 3+ vw right    Ambulatory referral to Orthopedic Surgery        Patient presents with symptoms and examination concerning for Lisfranc injury recommend x-ray for further evaluation.  X-ray demonstrates mild widening of the first Lisfranc interval with what appears to be a potential avulsion fracture off the base of the fourth metatarsal.  He is placed in a boot and given crutches and instructed to be nonweightbearing.  She is referred to orthopedics for GEN for new treatment and management.    Patient Instructions     Patient Instructions   Wear boot at all times until follow-up with orthopedic surgeon.  Crutches for ambulation you may not put any weight on your right foot.  No driving.  Take 600 to 800 mg of ibuprofen every 8 hours.  Supplement with Tylenol 1000 mg every 8 hours as needed, alternating every 4 hours with ibuprofen.  Ice 20 minutes on 20 minutes off.  Elevate above the level of the heart whenever not in use.  Follow-up with orthopedics soon as possible referral provided today.  If symptoms worsen or new symptoms develop report to the emergency room immediately.      Follow up with PCP in 3-5 days.  Proceed to  ER if symptoms worsen.    If tests have been performed at [facility], our office will contact you with results if changes need to be made to the care plan discussed with you at the visit. [facility]     Chief Complaint     Chief Complaint   Patient presents with    Fall     Patient states yesterday that her 80lb dog ran into her right leg and she fell forward onto the foot. She notes N/T of the right foot toes with shooting pain up the ankle. Patient has  pain bearing weight.          History of Present Illness       33-year-old female presents with complaint of right foot and ankle pain.  She states that yesterday she put her 80 pound Labrador to outside to use the bathroom after she got home from work and was taking a family member's dog as well when her Labrador became excited and ran at her striking her right leg.  She states that she had some mild pain last night but then this morning was unable to weight-bear.  She notes some numbness and tingling in her toes as well.    Fall        Review of Systems   Review of Systems   Musculoskeletal:  Positive for gait problem.         Current Medications       Current Outpatient Medications:     ALPRAZolam (XANAX) 0.5 mg tablet, Take 0.5 mg by mouth daily as needed, Disp: , Rfl:     amphetamine-dextroamphetamine (ADDERALL, 10MG,) 10 mg tablet, , Disp: , Rfl:     Current Allergies     Allergies as of 06/14/2024 - Reviewed 06/14/2024   Allergen Reaction Noted    Pollen extract Sneezing 04/07/2017            The following portions of the patient's history were reviewed and updated as appropriate: allergies, current medications, past family history, past medical history, past social history, past surgical history and problem list.     Past Medical History:   Diagnosis Date    Acne     Allergic     Concussion 2014    Fell in man hole    Depression 2004    Eczema     Headache(784.0)     Skin tag        Past Surgical History:   Procedure Laterality Date    APPENDECTOMY  December 2014    SKIN BIOPSY  April 2020       Family History   Problem Relation Age of Onset    Arthritis Mother     Eczema Father         Only around hands and elbows    Squamous cell carcinoma Father     Acne Brother     Colon cancer Maternal Grandmother     Hypertension Maternal Grandmother     Stroke Maternal Grandmother     Lung cancer Maternal Grandfather     Dementia Paternal Grandmother          Medications have been verified.        Objective   BP  118/76   Pulse 74   Temp 97.8 °F (36.6 °C)   Resp 16   LMP 12/28/2023 (Approximate)   SpO2 99%   Patient's last menstrual period was 12/28/2023 (approximate).       Physical Exam     Physical Exam  Vitals and nursing note reviewed.   Constitutional:       General: She is awake. She is not in acute distress.     Appearance: Normal appearance. She is well-developed and well-groomed. She is not ill-appearing, toxic-appearing or diaphoretic.   HENT:      Head: Normocephalic and atraumatic.      Right Ear: Hearing and external ear normal.      Left Ear: Hearing and external ear normal.   Eyes:      General: Lids are normal. Vision grossly intact. Gaze aligned appropriately.   Cardiovascular:      Rate and Rhythm: Normal rate.   Pulmonary:      Effort: Pulmonary effort is normal.      Comments: Patient is speaking in full sentences with no increased respiratory effort. No audible wheezing or stridor.   Musculoskeletal:      Cervical back: Normal range of motion.      Comments: Skin is clean dry intact.  There is mild developing plantar bruising noted on the right foot.  Patient has tenderness to palpation about the base of the metatarsals to the first Lisfranc joint.  She has pain with Lisfranc laxity texting.  Sensation is grossly intact.  Patient has full range of motion about her ankle with no pain.  There is no tenderness to palpation about the ankle.  Capillary refill is less than 2 seconds.   Skin:     General: Skin is warm and dry.   Neurological:      Mental Status: She is alert and oriented to person, place, and time.      Coordination: Coordination is intact.      Gait: Gait is intact.   Psychiatric:         Attention and Perception: Attention and perception normal.         Mood and Affect: Mood and affect normal.         Speech: Speech normal.         Behavior: Behavior normal. Behavior is cooperative.               Note: Portions of this record may have been created with voice recognition software.  "Occasional wrong word or \"sound a like\" substitutions may have occurred due to the inherent limitations of voice recognition software. Please read the chart carefully and recognize, using context, where substitutions have occurred.*      "

## 2024-06-14 NOTE — PATIENT INSTRUCTIONS
Wear boot at all times until follow-up with orthopedic surgeon.  Crutches for ambulation you may not put any weight on your right foot.  No driving.  Take 600 to 800 mg of ibuprofen every 8 hours.  Supplement with Tylenol 1000 mg every 8 hours as needed, alternating every 4 hours with ibuprofen.  Ice 20 minutes on 20 minutes off.  Elevate above the level of the heart whenever not in use.  Follow-up with orthopedics soon as possible referral provided today.  If symptoms worsen or new symptoms develop report to the emergency room immediately.

## 2024-06-19 ENCOUNTER — APPOINTMENT (OUTPATIENT)
Dept: RADIOLOGY | Facility: AMBULARY SURGERY CENTER | Age: 34
End: 2024-06-19
Attending: ORTHOPAEDIC SURGERY
Payer: COMMERCIAL

## 2024-06-19 ENCOUNTER — OFFICE VISIT (OUTPATIENT)
Dept: OBGYN CLINIC | Facility: CLINIC | Age: 34
End: 2024-06-19
Payer: COMMERCIAL

## 2024-06-19 VITALS
HEIGHT: 66 IN | SYSTOLIC BLOOD PRESSURE: 113 MMHG | DIASTOLIC BLOOD PRESSURE: 74 MMHG | WEIGHT: 166 LBS | HEART RATE: 82 BPM | BODY MASS INDEX: 26.68 KG/M2

## 2024-06-19 DIAGNOSIS — S93.621A LISFRANC'S SPRAIN, RIGHT, INITIAL ENCOUNTER: ICD-10-CM

## 2024-06-19 DIAGNOSIS — Z01.89 ENCOUNTER FOR LOWER EXTREMITY COMPARISON IMAGING STUDY: Primary | ICD-10-CM

## 2024-06-19 DIAGNOSIS — Z01.89 ENCOUNTER FOR LOWER EXTREMITY COMPARISON IMAGING STUDY: ICD-10-CM

## 2024-06-19 PROCEDURE — 73630 X-RAY EXAM OF FOOT: CPT

## 2024-06-19 PROCEDURE — 73620 X-RAY EXAM OF FOOT: CPT

## 2024-06-19 PROCEDURE — 99203 OFFICE O/P NEW LOW 30 MIN: CPT | Performed by: ORTHOPAEDIC SURGERY

## 2024-06-19 RX ORDER — FERROUS SULFATE 325(65) MG
1 TABLET ORAL EVERY OTHER DAY
COMMUNITY
Start: 2024-03-26

## 2024-06-19 RX ORDER — IBUPROFEN 800 MG/1
800 TABLET ORAL EVERY 8 HOURS PRN
COMMUNITY
Start: 2024-03-26

## 2024-06-19 RX ORDER — ASCORBIC ACID 500 MG
500 TABLET ORAL DAILY
COMMUNITY
Start: 2024-04-01

## 2024-06-19 RX ORDER — NORETHINDRONE ACETATE AND ETHINYL ESTRADIOL 1.5-30(21)
1 KIT ORAL DAILY
COMMUNITY
Start: 2024-03-26

## 2024-06-19 NOTE — PATIENT INSTRUCTIONS
You appear to have a grade 1 lisfranc ligament sprain.    Xrays demonstrate stability.    You may be weightbearing as tolerated in the boot.  Use the boot for your comfort but as soon as able to wean the boot you should.    You may begin weaning your boot and transitioning to a sneaker when you are comfortable (no later than 7/10). It is important to do this gradually to avoid aggravating the healing process.    The first day, you may come out of the boot into a sneaker for 2 hours.  2. The second day, you may come out of the boot into a sneaker for 4 hours,  3. The next day, you may come out of the boot into a sneaker for 6 hours.  4. Continue this (adding 2 hours per day) as you tolerate. For example, if you do 6 hours out of the boot into a sneaker and your foot swells more than usual at night and it is difficult to control the discomfort, do not advance to 8 hours the next day, stay at 6 hours until you are able to tolerate it.    Elevation, Ice and tylenol and staying off of it at night will be important to aide in this transition out of the boot. Swelling and soreness are normal as you begin to do more with the injured leg.

## 2024-06-19 NOTE — PROGRESS NOTES
James R Lachman, M.D.  Attending, Orthopaedic Surgery  Foot and Ankle  [facility]        ORTHOPAEDIC FOOT AND ANKLE CLINIC VISIT     Assessment:     Encounter Diagnoses   Name Primary?    Lisfranc's sprain, right, initial encounter     Encounter for lower extremity comparison imaging study Yes              Plan:   The patient verbalized understanding of exam findings and treatment plan. We engaged in the shared decision-making process and treatment options were discussed at length with the patient. Surgical and conservative management discussed today along with risks and benefits.  Patient has a grade 1 lisfranc ligament sprain of her right foot sustained on 6/14/24. The pathoanatomy and natural history of this diagnosis was explained to the patient in detail today in the office.   X rays demonstrate no instability of the Lisfranc ligament complex  She may continue to be WBAT in the CAM boot. Instructed to begin to wean the boot as soon as she is comfortable into a supportive sneaker but no later then 7/10/24.   If she feels she needs formal PT in the meantime then she will call our office to have a referral placed.   Repeat 3 view weight bearing x rays of the right foot with comparison will be obtain at her next visit  Return in about 4 weeks (around 7/17/2024).      History of Present Illness:   Chief Complaint:   Chief Complaint   Patient presents with    Right Ankle - Pain     Last week her dog ran into her and she fell on her ankle. In cam boot and walking on it.      Leida Jean is a 33 y.o. female who is being seen for right foot pain. Patient reports that her dog jumped on her and she had a twisting injury to her foot.  Pain is localized at midfoot with minimal radiating and described as sharp and severe. Patient denies numbness, tingling or radicular pain.  Denies history of neuropathy.  Patient does not smoke, does not have diabetes and does not take blood thinners.  Patient  denies family history of anesthesia complications and has not had any complications with anesthesia.     Pain/symptom timing:  Worse during the day when active  Pain/symptom context:  Worse with activites and work  Pain/symptom modifying factors:  Rest makes better, activities make worse  Pain/symptom associated signs/symptoms: none    Prior treatment   NSAIDsYes    Injections No   Bracing/Orthotics Yes WBAT in CAM boot  Physical Therapy No     Orthopedic Surgical History:   See below    Past Medical, Surgical and Social History:  Past Medical History:  has a past medical history of Acne, Allergic, Concussion (2014), Depression (2004), Eczema, Headache(784.0), and Skin tag.  Problem List: does not have any pertinent problems on file.  Past Surgical History:  has a past surgical history that includes Skin biopsy (April 2020) and Appendectomy (December 2014).  Family History: family history includes Acne in her brother; Arthritis in her mother; Colon cancer in her maternal grandmother; Dementia in her paternal grandmother; Eczema in her father; Hypertension in her maternal grandmother; Lung cancer in her maternal grandfather; Squamous cell carcinoma in her father; Stroke in her maternal grandmother.  Social History:  reports that she quit smoking about 2 years ago. Her smoking use included cigarettes. She started smoking about 18 years ago. She has a 4.1 pack-year smoking history. She has never used smokeless tobacco. She reports current alcohol use of about 15.0 standard drinks of alcohol per week. She reports that she does not use drugs.  Current Medications: has a current medication list which includes the following prescription(s): alprazolam, amphetamine-dextroamphetamine, ascorbic acid, ferrous sulfate, ibuprofen, and norethindrone-ethinyl estradiol-iron.  Allergies: is allergic to pollen extract.     Review of Systems:  General- denies fever/chills  HEENT- denies hearing loss or sore throat  Eyes- denies eye  "pain or visual disturbances, denies red eyes  Respiratory- denies cough or SOB  Cardio- denies chest pain or palpitations  GI- denies abdominal pain  Endocrine- denies urinary frequency  Urinary- denies pain with urination  Musculoskeletal- Negative except noted above  Skin- denies rashes or wounds  Neurological- denies dizziness or headache  Psychiatric- denies anxiety or difficulty concentrating    Physical Exam:   /74   Pulse 82   Ht 5' 6\" (1.676 m)   Wt 75.3 kg (166 lb)   LMP 12/28/2023 (Approximate)   BMI 26.79 kg/m²   General/Constitutional: No apparent distress: well-nourished and well developed.  Eyes: normal ocular motion  Cardio: RRR, Normal S1S2, No m/r/g  Lymphatic: No appreciable lymphadenopathy  Respiratory: Non-labored breathing, CTA b/l no w/c/r  Vascular: No edema, swelling or tenderness, except as noted in detailed exam.  Integumentary: No impressive skin lesions present, except as noted in detailed exam.  Neuro: No ataxia or tremors noted  Psych: Normal mood and affect, oriented to person, place and time. Appropriate affect.  Musculoskeletal: Normal, except as noted in detailed exam and in HPI.    Examination    Right    Gait Antalgic in CAM boot   Musculoskeletal Tender to palpation at Lisfranc ligament complex    Skin Normal.      Nails Normal    Range of Motion  20 degrees dorsiflexion, 30 degrees plantarflexion  Subtalar motion: normal    Stability Stable    Muscle Strength 5/5 tibialis anterior  5/5 gastrocnemius-soleus  5/5 posterior tibialis  5/5 peroneal/eversion strength  5/5 EHL  5/5 FHL    Neurologic Normal    Sensation Intact to light touch throughout sural, saphenous, superficial peroneal, deep peroneal and medial/lateral plantar nerve distributions.  Courtland-Mckinley 5.07 filament (10g) testing deferred.    Cardiovascular Brisk capillary refill < 2 seconds,intact DP and PT pulses    Special Tests None      Imaging Studies:   3 views of the right foot were taken, reviewed " and interpreted independently that demonstrate no instability in the lisfranc ligament complex. No acute fracture or dislocation. Reviewed by me personally.        James R. Lachman, MD  Foot & Ankle Surgery   Department of Orthopaedic Surgery  [facility]      I personally performed the service.    James R. Lachman, MD    Scribe Attestation      I,:  Vernon Lopez am acting as a scribe while in the presence of the attending physician.:       I,:  James R Lachman, MD personally performed the services described in this documentation    as scribed in my presence.:

## 2024-06-26 DIAGNOSIS — Z00.6 ENCOUNTER FOR EXAMINATION FOR NORMAL COMPARISON OR CONTROL IN CLINICAL RESEARCH PROGRAM: ICD-10-CM

## 2024-09-12 ENCOUNTER — TELEPHONE (OUTPATIENT)
Age: 34
End: 2024-09-12

## 2024-09-12 NOTE — TELEPHONE ENCOUNTER
Leida called in to request Wait List. Her Previous Psychiatrist does not take her new BC/ insurance. Leida looked at the BC/ insurance list and found a Provider on the list. Her name is Berta Almaguer. She does not know if she is MM or TT.   Leida would prefer a Female Provider. She thinks [place] would be closer but she would also take the [place] office, whichever becomes available first.  She just [facility] to BC/BS.

## 2024-09-16 ENCOUNTER — OFFICE VISIT (OUTPATIENT)
Dept: DERMATOLOGY | Facility: CLINIC | Age: 34
End: 2024-09-16
Payer: COMMERCIAL

## 2024-09-16 VITALS — BODY MASS INDEX: 27.06 KG/M2 | WEIGHT: 168.4 LBS | TEMPERATURE: 97.3 F | HEIGHT: 66 IN

## 2024-09-16 DIAGNOSIS — D22.71 MULTIPLE BENIGN MELANOCYTIC NEVI OF UPPER AND LOWER EXTREMITIES AND TRUNK: ICD-10-CM

## 2024-09-16 DIAGNOSIS — L91.8 SKIN TAG: ICD-10-CM

## 2024-09-16 DIAGNOSIS — L70.0 ACNE VULGARIS: ICD-10-CM

## 2024-09-16 DIAGNOSIS — D48.9 NEOPLASM OF UNCERTAIN BEHAVIOR: Primary | ICD-10-CM

## 2024-09-16 DIAGNOSIS — D22.62 MULTIPLE BENIGN MELANOCYTIC NEVI OF UPPER AND LOWER EXTREMITIES AND TRUNK: ICD-10-CM

## 2024-09-16 DIAGNOSIS — D22.5 MULTIPLE BENIGN MELANOCYTIC NEVI OF UPPER AND LOWER EXTREMITIES AND TRUNK: ICD-10-CM

## 2024-09-16 DIAGNOSIS — D22.72 MULTIPLE BENIGN MELANOCYTIC NEVI OF UPPER AND LOWER EXTREMITIES AND TRUNK: ICD-10-CM

## 2024-09-16 DIAGNOSIS — L72.11 PILAR CYST: ICD-10-CM

## 2024-09-16 DIAGNOSIS — D22.61 MULTIPLE BENIGN MELANOCYTIC NEVI OF UPPER AND LOWER EXTREMITIES AND TRUNK: ICD-10-CM

## 2024-09-16 PROCEDURE — 88342 IMHCHEM/IMCYTCHM 1ST ANTB: CPT | Performed by: STUDENT IN AN ORGANIZED HEALTH CARE EDUCATION/TRAINING PROGRAM

## 2024-09-16 PROCEDURE — 99214 OFFICE O/P EST MOD 30 MIN: CPT

## 2024-09-16 PROCEDURE — 88341 IMHCHEM/IMCYTCHM EA ADD ANTB: CPT | Performed by: STUDENT IN AN ORGANIZED HEALTH CARE EDUCATION/TRAINING PROGRAM

## 2024-09-16 PROCEDURE — 11102 TANGNTL BX SKIN SINGLE LES: CPT

## 2024-09-16 PROCEDURE — 88360 TUMOR IMMUNOHISTOCHEM/MANUAL: CPT | Performed by: STUDENT IN AN ORGANIZED HEALTH CARE EDUCATION/TRAINING PROGRAM

## 2024-09-16 PROCEDURE — 88305 TISSUE EXAM BY PATHOLOGIST: CPT | Performed by: STUDENT IN AN ORGANIZED HEALTH CARE EDUCATION/TRAINING PROGRAM

## 2024-09-16 NOTE — PROGRESS NOTES
"[facility] Note     Patient Name: Leida Jean  Encounter Date: 9/16/24     Have you been cared for by a [facility] Dermatologist in the last 3 years and, if so, which description applies to you?    Yes.  I have been here within the last 3 years, and my medical history has NOT changed since that time.  I am FEMALE/of child-bearing potential.    REVIEW OF SYSTEMS:  Have you recently had or currently have any of the following? No changes in my recent health.   PAST MEDICAL HISTORY:  Have you personally ever had or currently have any of the following?  If \"YES,\" then please provide more detail. No changes in my medical history.   HISTORY OF IMMUNOSUPPRESSION: Do you have a history of any of the following:  Systemic Immunosuppression such as Diabetes, Biologic or Immunotherapy, Chemotherapy, Organ Transplantation, Bone Marrow Transplantation or Prednisone?  No     Answering \"YES\" requires the addition of the dotphrase \"IMMUNOSUPPRESSED\" as the first diagnosis of the patient's visit.   FAMILY HISTORY:  Any \"first degree relatives\" (parent, brother, sister, or child) with the following?    Father history of SCC   PATIENT EXPERIENCE:    Do you want the Dermatologist to perform a COMPLETE skin exam today including a clinical examination under the \"bra and underwear\" areas?  Yes  If necessary, do we have your permission to call and leave a detailed message on your Preferred Phone number that includes your specific medical information?  Yes      Allergies   Allergen Reactions    Pollen Extract Sneezing     sneezing, itchy eyes      Current Outpatient Medications:     ALPRAZolam (XANAX) 0.5 mg tablet, Take 0.5 mg by mouth daily as needed, Disp: , Rfl:     amphetamine-dextroamphetamine (ADDERALL, 10MG,) 10 mg tablet, , Disp: , Rfl:     Multiple Vitamins-Minerals (WOMENS MULTIVITAMIN PO), Take by mouth, Disp: , Rfl:     ascorbic acid (VITAMIN C) 500 mg tablet, Take 500 mg by mouth daily, Disp: , Rfl:     " "ferrous sulfate 325 (65 Fe) mg tablet, Take 1 tablet by mouth every other day, Disp: , Rfl:     ibuprofen (MOTRIN) 800 mg tablet, Take 800 mg by mouth every 8 (eight) hours as needed, Disp: , Rfl:     norethindrone-ethinyl estradiol-iron (MICROGESTIN FE1.5/30) 1.5-30 MG-MCG tablet, Take 1 tablet by mouth daily (Patient not taking: Reported on 9/16/2024), Disp: , Rfl:           Whom besides the patient is providing clinical information about today's encounter?   NO ADDITIONAL HISTORIAN (patient alone provided history)    Physical Exam and Assessment/Plan by Diagnosis:     MULTIPLE MELANOCYTIC NEVI (\"Moles\")      Physical Exam:  Anatomic Location Affected: Trunk, face, buttocks, extremities, right plantar foot  Morphological Description:  Scattered, round to ovoid, mostly symmetrical-appearing, even bordered, skin colored to dark brown macules/papules  Denies pain, itch, bleeding. No treatments tried. Present for years. Present constantly; no modifying factors which make it worse or better. Denies actively changing or growing moles.       Additional History of Present Condition: Patient describes that she has had multiple moles biopsied that came back benign in the past. No personal hx of skin cancer. Endorses family hx of skin cancer (squamous cell carcinoma - father)     Assessment and Plan:  Based on a thorough discussion of this condition and the management approach to it (including a comprehensive discussion of the known risks, side effects and potential benefits of treatment), the patient (family) agrees to implement the following specific plan:  Reassure benign  Monitor for changes  Use sun protection.  Apply SPF 30 or higher at least three times a day.  Wear sun protecting clothing and hats. Reapply sunscreen every 2 hours  Follow up in 6 months for skin check  Photos taken for chart given high number of moles. We discussed looking for \"ugly duckling\" moles that stand out among her own usual mole shape and " "coloring        Worrisome signs of skin malignancy discussed, questions answered. Regular self-skin check discussed. Advised to call or return to office if patient notices any spots of concern, rapidly growing/changing lesions, bleeding lesions, non-healing lesions. Advised regular SPF use.      NEOPLASM OF UNCERTAIN BEHAVIOR OF SKIN    Physical Exam:  (Anatomic Location); (Size and Morphological Description); (Differential Diagnosis):  Specimen A; right upper back; skin; shave biopsy; 33 year old with 0.7 cm x 0.8 cm pink and brown macule with irregular borders; Rule out atypia        Additional History of Present Condition:  present on exam. Patient does not believe she ever had a biopsy of this specific site before. Does not know if it has been there or is changing given it is on her back    Assessment and Plan:  I have discussed with the patient that a sample of skin via a \"skin biopsy” would be potentially helpful to further make a specific diagnosis under the microscope.  Based on a thorough discussion of this condition and the management approach to it (including a comprehensive discussion of the known risks, side effects and potential benefits of treatment), the patient (family) agrees to implement the following specific plan:    Procedure:  Skin Biopsy.  After a thorough discussion of treatment options and risk/benefits/alternatives (including but not limited to local pain, scarring, dyspigmentation, blistering, possible superinfection, and inability to confirm a diagnosis via histopathology), verbal and written consent were obtained and portion of the rash was biopsied for tissue sample.  See below for consent that was obtained from patient and subsequent Procedure Note.     PROCEDURE TANGENTIAL (SHAVE) BIOPSY NOTE:    Performing Physician:  Shaneka Mcknight  Anatomic Location; Clinical Description with size (cm); Pre-Op Diagnosis:   Specimen A; right upper back; skin; shave biopsy; 33 year old with cm x cm " "pink and brown macule with irregular borders; Rule out atypia    Post-op diagnosis: Same     Local anesthesia: 1% xylocaine with epi      Topical anesthesia: None    Hemostasis: Aluminum chloride       After obtaining informed consent  at which time there was a discussion about the purpose of biopsy  and low risks of infection and bleeding.  The area was prepped and draped in the usual fashion. Anesthesia was obtained with 1% lidocaine with epinephrine. A shave biopsy to an appropriate sampling depth was obtained by Shave (Dermablade or 15 blade) The resulting wound was covered with surgical ointment and bandaged appropriately.     The patient tolerated the procedure well without complications and was without signs of functional compromise.      Specimen has been sent for review by Dermatopathology.    Standard post-procedure care has been explained and has been included in written form within the patient's copy of Informed Consent.    INFORMED CONSENT DISCUSSION AND POST-OPERATIVE INSTRUCTIONS FOR PATIENT    I.  RATIONALE FOR PROCEDURE  I understand that a skin biopsy allows the Dermatologist to test a lesion or rash under the microscope to obtain a diagnosis.  It usually involves numbing the area with numbing medication and removing a small piece of skin; sometimes the area will be closed with sutures. In this specific procedure, sutures are not usually needed.  If any sutures are placed, then they are usually need to be removed in 2 weeks or less.    I understand that my Dermatologist recommends that a skin \"shave\" biopsy be performed today.  A local anesthetic, similar to the kind that a dentist uses when filling a cavity, will be injected with a very small needle into the skin area to be sampled.  The injected skin and tissue underneath \"will go to sleep” and become numb so no pain should be felt afterwards.  An instrument shaped like a tiny \"razor blade\" (shave biopsy instrument) will be used to cut a small " "piece of tissue and skin from the area so that a sample of tissue can be taken and examined more closely under the microscope.  A slight amount of bleeding will occur, but it will be stopped with direct pressure and a pressure bandage and any other appropriate methods.  I understands that a scar will form where the wound was created.  Surgical ointment will be applied to help protect the wound.  Sutures are not usually needed.    II.  RISKS AND POTENTIAL COMPLICATIONS   I understand the risks and potential complications of a skin biopsy include but are not limited to the following:  Bleeding  Infection  Pain  Scar/keloid  Skin discoloration  Incomplete Removal  Recurrence  Nerve Damage/Numbness/Loss of Function  Allergic Reaction to Anesthesia  Biopsies are diagnostic procedures and based on findings additional treatment or evaluation may be required  Loss or destruction of specimen resulting in no additional findings    My Dermatologist has explained to me the nature of the condition, the nature of the procedure, and the benefits to be reasonably expected compared with alternative approaches.  My Dermatologist has discussed the likelihood of major risks or complications of this procedure including the specific risks listed above, such as bleeding, infection, and scarring/keloid.  I understand that a scar is expected after this procedure.  I understand that my physician cannot predict if the scar will form a \"keloid,\" which extends beyond the borders of the wound that is created.  A keloid is a thick, painful, and bumpy scar.  A keloid can be difficult to treat, as it does not always respond well to therapy, which includes injecting cortisone directly into the keloid every few weeks.  While this usually reduces the pain and size of the scar, it does not eliminate it.      I understand that photographs may be taken before and after the procedure.  These will be maintained as part of the medical providers confidential " "records and may not be made available to me.  I further authorize the medical provider to use the photographs for teaching purposes or to illustrate scientific papers, books, or lectures if in his/her judgment, medical research, education, or science may benefit from its use.    I have had an opportunity to fully inquire about the risks and benefits of this procedure and its alternatives.   I have been given ample time and opportunity to ask questions and to seek a second opinion if I wished to do so.  I acknowledge that there have specifically been no guarantees as to the cosmetic results from the procedure.  I am aware that with any procedure there is always the possibility of an unexpected complication.    III. POST-PROCEDURAL CARE (WHAT YOU WILL NEED TO DO \"AFTER THE BIOPSY\" TO OPTIMIZE HEALING)    Keep the area clean and dry.  Try NOT to remove the bandage or get it wet for the first 24 hours.    Gently clean the area and apply surgical ointment (such as Vaseline petrolatum ointment, which is available \"over the counter\" and not a prescription) to the biopsy site for up to 2 weeks straight.  This acts to protect the wound from the outside world.      Sutures are not usually placed in this procedure.  If any sutures were placed, return for suture removal as instructed (generally 1 week for the face, 2 weeks for the body).      Take Acetaminophen (Tylenol) for discomfort, if no contraindications.  Ibuprofen or aspirin could make bleeding worse.    Call our office immediately for signs of infection: fever, chills, increased redness, warmth, tenderness, discomfort/pain, or pus or foul smell coming from the wound.    WHAT TO DO IF THERE IS ANY BLEEDING?  If a small amount of bleeding is noticed, place a clean cloth over the area and apply firm pressure for ten minutes.  Check the wound after 10 minutes of direct pressure.  If bleeding persists, try one more time for an additional 10 minutes of direct pressure on the " area.  If the bleeding becomes heavier or does not stop after the second attempt, or if you have any other questions about this procedure, then please call your [facility]'s Dermatologist by calling [phone] (SKIN).     I hereby acknowledge that I have reviewed and verified the site with my Dermatologist and have requested and authorized my Dermatologist to proceed with the procedure.            PILAR CYST    Physical Exam:  Anatomic Location Affected:  left vertex scalp   Morphological Description:  firm, mobile subcutaneous nodule  Pertinent Positives:  Pertinent Negatives:    Additional History of Present Condition:  present on exam -patient reports has been present about 5 years without change and is not bothersome    Assessment and Plan:  Based on a thorough discussion of this condition and the management approach to it (including a comprehensive discussion of the known risks, side effects and potential benefits of treatment), the patient (family) agrees to implement the following specific plan:  Reassured benign. Monitor for changes.   Discussed that if cyst becomes bothersome, it can be removed.     PILAR CYST  A trichilemmal cyst, also known as a pilar cyst, is a keratin-filled cyst that originates from the outer hair root sheath. Keratin is the protein that makes up hair and nails. Trichilemmal cysts are most commonly found on the scalp and are usually diagnosed in middle-aged females. They often run in the family, as they have an autosomal dominant pattern of inheritance (ie, the tendency to the cysts can be is passed on by a parent to their child of either sex, and the child has a 1 in 2 likelihood of inheriting it).    What are the clinical features of trichilemmal cyst?  Trichilemmal cysts may look similar to epidermoid cyst and are often incorrectly termed sebaceous cysts. Trichilemmal cysts present as one or more firm, mobile, subcutaneous nodules measuring of various size. There is no central  "punctum, unlike an epidermoid cyst. A trichilemmal cyst can be painful if inflamed.      What is the treatment for trichilemmal cysts?  Pilar cysts can slowly enlarge, rupture with marked  inflammation, and can be associated with hairloss.  It was discused that removal ideally includes escision of sac and contents often with sutures    ACROCHORDON (\"SKIN TAG\")    Physical Exam:  Anatomic Location Affected:  neck  Morphological Description:  skin colored to light brown and pink pedunculated papules  Pertinent Positives:  Pertinent Negatives:    Additional History of Present Condition:  Patient describes that she noticed multiple skin tags formed on her neck and chest area.      Assessment and Plan:  Based on a thorough discussion of this condition and the management approach to it (including a comprehensive discussion of the known risks, side effects and potential benefits of treatment), the patient (family) agrees to implement the following specific plan:  Reassured benign. Monitor for changes.   Option for cosmetic removal if desired    Skin tags are common, soft, harmless skin lesions that are also called, in the appropriate settings, papillomas, fibroepithelial polyps, and soft fibromas.  They are made up of loosely arranged collagen fibers and blood vessels surrounded by a thickened or thinned-out epidermis.    Skin tags tend to develop in both men and women as we grow older.  They are usually found on the skin folds (neck, armpits, groin).  It is not known what specifically causes skin tags.  Certain factors, though, do appear to play a role:  Chaffing and irritation from skin rubbing together  High levels of growth factors (as seen, for example, in pregnancy or in acromegaly/gigantism)  Insulin resistance  Human papillomavirus (wart virus)    We discussed that most skin tags do not need to be treated unless they are specifically causing the patient physical distress or limitation or pose a risk for a larger " "problem such as an infection that forms secondary to excoriation or chronic irritation.    We had a thorough discussion of treatment options and specific risks (including that any procedural treatment may not be covered by insurance and would then be the patient's responsibility) and benefits/alternatives including but not limited to the following:  Cryotherapy (freezing)  Shave removal  Surgical excision (snip excision with scissors)  Electrosurgery  Ligation (we do not do this procedure and counseled against it due to risk of tissue necrosis and infection)    ACNE VULGARIS (\"COMMON ACNE\")    Physical Exam:  Anatomic Location Affected:  chest  Morphological Description: Scattered small pustules and comedones  Pertinent Positives:  Pertinent Negatives:    Additional History of Present Condition:  patient reports having acne on chest     Discussed that treatment is directed at improving skin appearance and reducing the likelihood of scarring. Discussed theraputic ladder including topical OTC treatments, topical prescriptions, and oral medications. Discussed side effects as noted below.     Plan today:     AM:  - Start benzoyl peroxide or salicylic acid wash for chest when showering   - SPF 30 or greater (can be a lotion with SPF like CeraVe AM)  - Start non-comedogenic moisturizer such as CeraVe, Cetaphil or Vanicream.      PM:  - Gentle cleanser, such as CeraVe, Cetaphil or La Roche Posay  - Start Adapalene 0.1% gel over the counter qHS. Educated that this medication can be drying and irritating. Start by applying a pea-sized amount of product 2 nights per week, then increase to nightly as tolerated. Written instructions provided.  - Start non-comedogenic moisturizer such as CeraVe, Cetaphil or Vanicream. May use on top of retinoid. If retinoid is too drying, may employ the \"sandwich method.\" To do this, apply layer of non-comedogenic moisturizer, followed by layer of retinoid, followed by another layer of " non-comedogenic moisturizer.       COMMON POSSIBLE SIDE EFFECTS OF MEDICATIONS    Retinoids - dryness, redness, increased sun sensitivity.  Benzoyl peroxide - drying, redness, bleaching of clothes, towels and sheets, allergy.    WHEN AND WHERE TO CALL WITH CONCERNS  We are here to help!  If you experience any unusual symptoms, then stop taking or using the medication and call our office at [phone] (SKIN).  It is better to be safe than to be sorry!      Scribe Attestation      I,:  Marisa Cantu am acting as a scribe while in the presence of the attending physician.:       I,:  Shaneka Mcknight PA-C personally performed the services described in this documentation    as scribed in my presence.:

## 2024-09-20 PROCEDURE — 88360 TUMOR IMMUNOHISTOCHEM/MANUAL: CPT | Performed by: STUDENT IN AN ORGANIZED HEALTH CARE EDUCATION/TRAINING PROGRAM

## 2024-09-20 PROCEDURE — 88305 TISSUE EXAM BY PATHOLOGIST: CPT | Performed by: STUDENT IN AN ORGANIZED HEALTH CARE EDUCATION/TRAINING PROGRAM

## 2024-09-20 PROCEDURE — 88342 IMHCHEM/IMCYTCHM 1ST ANTB: CPT | Performed by: STUDENT IN AN ORGANIZED HEALTH CARE EDUCATION/TRAINING PROGRAM

## 2024-09-20 PROCEDURE — 88341 IMHCHEM/IMCYTCHM EA ADD ANTB: CPT | Performed by: STUDENT IN AN ORGANIZED HEALTH CARE EDUCATION/TRAINING PROGRAM

## 2024-09-23 NOTE — RESULT ENCOUNTER NOTE
DERMATOPATHOLOGY RESULT NOTE    Results reviewed by ordering physician.  Called patient to personally discuss results. Discussed results with patient.       Instructions for Clinical Derm Team:   (remember to route Result Note to appropriate staff):    Call patient and schedule for excision of severely dysplastic nevus on right upper back    Result & Plan by Specimen:    Specimen A: benign  Plan: excision      Tissue Exam: [id number]  Order: [id number]   Status: Final result      Visible to patient: Yes (seen)      Dx: Neoplasm of uncertain behavior    0 Result Notes     Component   Case Report  Surgical Pathology Report                         Case: [id number]                                  Authorizing Provider:  Shaneka Mcknight PA-C          Collected:           09/16/2024 0948              Ordering Location:     [facility]      Received:            09/16/2024 0951                                     [place]                                                                      Pathologist:           John Waldron MD                                                          Specimen:    Skin, Other, Specimen A; right upper back; skin;                                        Final Diagnosis  A. Skin, right upper back, shave biopsy:    Lentiginous compound dysplastic nevus with severe cytologic atypia; extends to the tissue edges (see note).    Note: SOX10, HMB45, p16, and PRAME immunostains were reviewed; significant melanocytic architectural disorder is not seen, and the lesional cells retain expression of p16 and are largely negative for PRAME (approximately 5-10% of lesional cells are weakly positive).      Electronically signed by John Waldron MD on 9/20/2024 at  5:26 PM  Additional Information   All reported additional testing was performed with appropriately reactive controls.  These tests were developed and their performance characteristics determined by [facility] or  "appropriate performing facility, though some tests may be performed on tissues which have not been validated for performance characteristics (such as staining performed on alcohol exposed cell blocks and decalcified tissues).  Results should be interpreted with caution and in the context of the patients' clinical condition. These tests may not be cleared or approved by the U.S. Food and Drug Administration, though the FDA has determined that such clearance or approval is not necessary. These tests are used for clinical purposes and they should not be regarded as investigational or for research. This laboratory has been approved by CLIA 88, designated as a high-complexity laboratory and is qualified to perform these tests.  .  Gross Description   A. The specimen is received in formalin, labeled with the patient's name and hospital number, and is designated \" right upper back\".  The specimen consists of a tan-brown skin shave measuring 0.9 x 0.8 x 0.1 cm.  The apparent margin of resection is inked green.  Serially sectioned and entirely submitted between sponges in 1 cassette.    Note: The estimated total formalin fixation time based upon information provided by the submitting clinician and the standard processing schedule is under 72 hours. [facility]    Clinical Information   ATTN DERM PATH  Specimen A; right upper back; skin; shave biopsy; 33 year old with 0.7 cm x 0.8 cm pink and brown macule with irregular borders; Rule out atypia  Resulting Agency [id number]          Specimen Collected: 09/16/24  9:48 AM Last Resulted: 09/20/24  5:26 PM     Order Details       View Encounter       Lab and Collection Details       Routing       Result History    View All Conversations on this Encounter        Scans on Order [id number]    Lab Result Document - Document on 9/20/2024  5:26 PM          "

## 2024-09-27 ENCOUNTER — OFFICE VISIT (OUTPATIENT)
Dept: INTERNAL MEDICINE CLINIC | Facility: CLINIC | Age: 34
End: 2024-09-27
Payer: COMMERCIAL

## 2024-09-27 VITALS
WEIGHT: 165 LBS | HEART RATE: 76 BPM | BODY MASS INDEX: 26.52 KG/M2 | HEIGHT: 66 IN | SYSTOLIC BLOOD PRESSURE: 108 MMHG | DIASTOLIC BLOOD PRESSURE: 74 MMHG | TEMPERATURE: 96.6 F | OXYGEN SATURATION: 98 %

## 2024-09-27 DIAGNOSIS — J31.0 RHINITIS, UNSPECIFIED TYPE: ICD-10-CM

## 2024-09-27 DIAGNOSIS — F90.2 ATTENTION DEFICIT HYPERACTIVITY DISORDER (ADHD), COMBINED TYPE: Primary | ICD-10-CM

## 2024-09-27 DIAGNOSIS — D72.819 LEUKOPENIA, UNSPECIFIED TYPE: ICD-10-CM

## 2024-09-27 DIAGNOSIS — U07.1 COVID-19 VIRUS INFECTION: ICD-10-CM

## 2024-09-27 DIAGNOSIS — Z87.828 HISTORY OF HEAD INJURY: ICD-10-CM

## 2024-09-27 DIAGNOSIS — S93.622S LISFRANC'S SPRAIN, LEFT, SEQUELA: ICD-10-CM

## 2024-09-27 DIAGNOSIS — F41.9 ANXIETY: ICD-10-CM

## 2024-09-27 DIAGNOSIS — D23.5 DYSPLASTIC NEVUS OF TRUNK: ICD-10-CM

## 2024-09-27 PROCEDURE — 99214 OFFICE O/P EST MOD 30 MIN: CPT | Performed by: INTERNAL MEDICINE

## 2024-09-27 RX ORDER — DEXTROAMPHETAMINE SACCHARATE, AMPHETAMINE ASPARTATE, DEXTROAMPHETAMINE SULFATE AND AMPHETAMINE SULFATE 2.5; 2.5; 2.5; 2.5 MG/1; MG/1; MG/1; MG/1
10 TABLET ORAL DAILY
Qty: 30 TABLET | Refills: 0 | Status: SHIPPED | OUTPATIENT
Start: 2024-09-27

## 2024-09-27 RX ORDER — AZELASTINE 1 MG/ML
1 SPRAY, METERED NASAL 2 TIMES DAILY PRN
Qty: 30 ML | Refills: 0 | Status: SHIPPED | OUTPATIENT
Start: 2024-09-27

## 2024-09-27 NOTE — PROGRESS NOTES
Ambulatory Visit  Name: Leida Jean      : 1990      MRN: [id number]  Encounter Provider: Carlotta Barone MD  Encounter Date: 2024   Encounter department: [facility] INTERNAL MEDICINE    Assessment & Plan  Attention deficit hyperactivity disorder (ADHD), combined type  PDMP reviewed.  Will prescribe Adderall until she is established with new psychiatrist, due to insurance.    Orders:    amphetamine-dextroamphetamine (ADDERALL, 10MG,) 10 mg tablet; Take 1 tablet (10 mg total) by mouth daily Max Daily Amount: 10 mg    Anxiety  Rare use of alprazolam, for anxiety or sleep.         History of head injury         COVID-19 virus infection  Symptoms mostly resolved.    Orders:    azelastine (ASTELIN) 0.1 % nasal spray; 1 spray into each nostril 2 (two) times a day as needed for rhinitis Use in each nostril as directed    Rhinitis, unspecified type  Recommend to continue daily antihistamine.  May use Flonase prn.    Orders:    azelastine (ASTELIN) 0.1 % nasal spray; 1 spray into each nostril 2 (two) times a day as needed for rhinitis Use in each nostril as directed    Dysplastic nevus of trunk  Reviewed pathology.  Excision scheduled for January.         Lisfranc's sprain, left, sequela  Resolved.       Leukopenia, unspecified type  Stable.          Follow up as scheduled or as needed.    History of Present Illness     She reports that her psychiatrist in [place] had stopped taking her insurance.  She had called [facility] psychiatry for an appointment but is currently on the wait list.  She takes Adderall every other day.  She does not like taking it daily, able to manage.  She was started on this since .     She also takes Xanax as needed for anxiety or sleep.  She for started taking this in  after head injury.  She had developed panic attacks at that time, with anxiety and difficulty with sleeping.  She reports a prescription would last her a year.    She reports that she had  "COVID at the end of July.  She has mostly recovered but has ongoing sinus congestion and frequent postnasal drip.  She has been using Flonase for over a month.  She stopped using it since instructions said to do so after 30 days.  She started taking Zyrtec, does not take it daily.  She feels that it does help with her symptoms.  Denies any cough, shortness of breath or wheezing.  She thinks she may have gotten COVID after she traveled to [place].        Review of Systems   Constitutional:  Negative for activity change, appetite change and fever.   HENT:  Positive for congestion and postnasal drip. Negative for rhinorrhea, sinus pressure, sinus pain and sore throat.    Psychiatric/Behavioral:  Positive for decreased concentration. Negative for sleep disturbance. The patient is not nervous/anxious and is not hyperactive.            Objective     /74   Pulse 76   Temp (!) 96.6 °F (35.9 °C)   Ht 5' 6\" (1.676 m)   Wt 74.8 kg (165 lb)   LMP 12/28/2023 (Approximate)   SpO2 98%   BMI 26.63 kg/m²     Physical Exam  Constitutional:       Appearance: Normal appearance.   HENT:      Right Ear: External ear normal.      Left Ear: External ear normal. There is impacted cerumen.      Nose: Congestion and rhinorrhea present.      Mouth/Throat:      Mouth: Mucous membranes are moist.   Eyes:      Pupils: Pupils are equal, round, and reactive to light.   Cardiovascular:      Rate and Rhythm: Normal rate and regular rhythm.   Pulmonary:      Effort: Pulmonary effort is normal. No respiratory distress.   Skin:         Neurological:      General: No focal deficit present.      Mental Status: She is alert and oriented to person, place, and time.   Psychiatric:         Mood and Affect: Mood normal.         Behavior: Behavior normal.         "

## 2024-09-27 NOTE — ASSESSMENT & PLAN NOTE
Symptoms mostly resolved.    Orders:    azelastine (ASTELIN) 0.1 % nasal spray; 1 spray into each nostril 2 (two) times a day as needed for rhinitis Use in each nostril as directed

## 2024-09-27 NOTE — ASSESSMENT & PLAN NOTE
Recommend to continue daily antihistamine.  May use Flonase prn.    Orders:    azelastine (ASTELIN) 0.1 % nasal spray; 1 spray into each nostril 2 (two) times a day as needed for rhinitis Use in each nostril as directed

## 2024-09-27 NOTE — ASSESSMENT & PLAN NOTE
PDMP reviewed.  Will prescribe Adderall until she is established with new psychiatrist, due to insurance.    Orders:    amphetamine-dextroamphetamine (ADDERALL, 10MG,) 10 mg tablet; Take 1 tablet (10 mg total) by mouth daily Max Daily Amount: 10 mg

## 2024-10-01 DIAGNOSIS — F90.2 ATTENTION DEFICIT HYPERACTIVITY DISORDER (ADHD), COMBINED TYPE: ICD-10-CM

## 2024-10-01 NOTE — TELEPHONE ENCOUNTER
Patient called and stated [facility] pharmacy is back ordered and does not have the Adderall 10 mg tablet in stock.  [facility] does have a limited supply.  She is requesting the refill to be sent to [facility] instead.  Please advise.  Thank you!

## 2024-10-02 RX ORDER — DEXTROAMPHETAMINE SACCHARATE, AMPHETAMINE ASPARTATE, DEXTROAMPHETAMINE SULFATE AND AMPHETAMINE SULFATE 2.5; 2.5; 2.5; 2.5 MG/1; MG/1; MG/1; MG/1
10 TABLET ORAL DAILY
Qty: 30 TABLET | Refills: 0 | Status: SHIPPED | OUTPATIENT
Start: 2024-10-02

## 2024-10-22 ENCOUNTER — OFFICE VISIT (OUTPATIENT)
Dept: URGENT CARE | Facility: CLINIC | Age: 34
End: 2024-10-22
Payer: COMMERCIAL

## 2024-10-22 VITALS
DIASTOLIC BLOOD PRESSURE: 69 MMHG | SYSTOLIC BLOOD PRESSURE: 118 MMHG | OXYGEN SATURATION: 97 % | HEART RATE: 80 BPM | TEMPERATURE: 97.7 F | RESPIRATION RATE: 16 BRPM

## 2024-10-22 DIAGNOSIS — J02.9 SORE THROAT: Primary | ICD-10-CM

## 2024-10-22 DIAGNOSIS — J01.90 SUBACUTE SINUSITIS, UNSPECIFIED LOCATION: ICD-10-CM

## 2024-10-22 LAB — S PYO AG THROAT QL: NEGATIVE

## 2024-10-22 PROCEDURE — S9083 URGENT CARE CENTER GLOBAL: HCPCS | Performed by: PHYSICIAN ASSISTANT

## 2024-10-22 PROCEDURE — G0382 LEV 3 HOSP TYPE B ED VISIT: HCPCS | Performed by: PHYSICIAN ASSISTANT

## 2024-10-22 PROCEDURE — 87880 STREP A ASSAY W/OPTIC: CPT | Performed by: PHYSICIAN ASSISTANT

## 2024-10-22 RX ORDER — AMOXICILLIN 875 MG/1
875 TABLET, COATED ORAL 2 TIMES DAILY
Qty: 14 TABLET | Refills: 0 | Status: SHIPPED | OUTPATIENT
Start: 2024-10-22 | End: 2024-10-29

## 2024-10-22 NOTE — PROGRESS NOTES
[facility]        NAME: Leida Jean is a 33 y.o. female  : 1990    MRN: [id number]  DATE: 2024  TIME: 6:44 PM    Assessment and Plan   Sore throat [J02.9]  1. Sore throat  POCT rapid ANTIGEN strepA      2. Subacute sinusitis, unspecified location  amoxicillin (AMOXIL) 875 mg tablet      Patient presents with sore throat symptoms concerning for possible strep.  Rapid strep in clinic is negative symptoms most consistent with irritation postnasal drip do not recommend culture at this time.  Patient also has symptoms consistent with subacute sinusitis she will be started on amoxicillin to treat.  Discussed with patient that if she does have strep this will treat treat strep as well.      Patient Instructions     Patient Instructions   Take amoxicillin as prescribed.  Continue over-the-counter antihistamine and Flonase.  If symptoms or not improved in 2 to 3 days follow-up with PCP.  If symptoms worsen or new symptoms develop report to the emergency room immediately.    Follow up with PCP in 3-5 days.  Proceed to  ER if symptoms worsen.    If tests have been performed at [facility], our office will contact you with results if changes need to be made to the care plan discussed with you at the visit. You can review your full results on [facility] MyChart.     Chief Complaint     Chief Complaint   Patient presents with    Sore Throat     X  3 days oc Theraflu         History of Present Illness       33-year-old female presents with complaint of sore throat for 3 days duration.  Patient notes that she has had runny nose sinus congestion with yellow and green drainage for the last 3 months since she had COVID.  Patient states that she has had intermittent sinus pressure and pain as well.  Notes postnasal drip.  Patient denies cough.    Sore Throat   Associated symptoms include congestion. Pertinent negatives include no coughing.       Review of Systems   Review of Systems   Constitutional:   Negative for chills and fever.   HENT:  Positive for congestion, postnasal drip, rhinorrhea, sinus pressure and sore throat.    Respiratory:  Negative for cough.          Current Medications       Current Outpatient Medications:     ALPRAZolam (XANAX) 0.5 mg tablet, Take 0.5 mg by mouth daily as needed, Disp: , Rfl:     amoxicillin (AMOXIL) 875 mg tablet, Take 1 tablet (875 mg total) by mouth 2 (two) times a day for 7 days, Disp: 14 tablet, Rfl: 0    amphetamine-dextroamphetamine (ADDERALL, 10MG,) 10 mg tablet, Take 1 tablet (10 mg total) by mouth daily Max Daily Amount: 10 mg, Disp: 30 tablet, Rfl: 0    azelastine (ASTELIN) 0.1 % nasal spray, 1 spray into each nostril 2 (two) times a day as needed for rhinitis Use in each nostril as directed, Disp: 30 mL, Rfl: 0    Multiple Vitamins-Minerals (WOMENS MULTIVITAMIN PO), Take by mouth, Disp: , Rfl:     norethindrone-ethinyl estradiol-iron (MICROGESTIN FE1.5/30) 1.5-30 MG-MCG tablet, Take 1 tablet by mouth daily, Disp: , Rfl:     Current Allergies     Allergies as of 10/22/2024 - Reviewed 10/22/2024   Allergen Reaction Noted    Pollen extract Sneezing 04/07/2017            The following portions of the patient's history were reviewed and updated as appropriate: allergies, current medications, past family history, past medical history, past social history, past surgical history and problem list.     Past Medical History:   Diagnosis Date    Acne     Allergic     Concussion 2014    Fell in man hole    Depression 2004    Eczema     Headache(784.0)     Skin tag        Past Surgical History:   Procedure Laterality Date    APPENDECTOMY  December 2014    SKIN BIOPSY  April 2020       Family History   Problem Relation Age of Onset    Arthritis Mother     Eczema Father         Only around hands and elbows    Squamous cell carcinoma Father     Acne Brother     Colon cancer Maternal Grandmother     Hypertension Maternal Grandmother     Stroke Maternal Grandmother     Lung cancer  Maternal Grandfather     Dementia Paternal Grandmother          Medications have been verified.        Objective   /69 (Patient Position: Sitting)   Pulse 80   Temp 97.7 °F (36.5 °C)   Resp 16   LMP 12/28/2023 (Approximate)   SpO2 97%   Patient's last menstrual period was 12/28/2023 (approximate).       Physical Exam     Physical Exam  Vitals and nursing note reviewed.   Constitutional:       General: She is awake. She is not in acute distress.     Appearance: Normal appearance. She is well-developed and well-groomed. She is not ill-appearing, toxic-appearing or diaphoretic.   HENT:      Head: Normocephalic and atraumatic.      Jaw: No trismus.      Right Ear: Hearing, tympanic membrane, ear canal and external ear normal. There is no impacted cerumen. No foreign body.      Left Ear: Hearing, tympanic membrane, ear canal and external ear normal. There is no impacted cerumen. No foreign body.      Nose: Mucosal edema, congestion and rhinorrhea present. No nasal deformity. Rhinorrhea is purulent.      Right Nostril: No foreign body, epistaxis or occlusion.      Left Nostril: No foreign body, epistaxis or occlusion.      Right Turbinates: Not enlarged, swollen or pale.      Left Turbinates: Not enlarged, swollen or pale.      Mouth/Throat:      Lips: Pink. No lesions.      Mouth: Mucous membranes are moist. No injury, oral lesions or angioedema.      Dentition: Normal dentition.      Tongue: No lesions. Tongue does not deviate from midline.      Palate: No mass and lesions.      Pharynx: Uvula midline. Uvula swelling and postnasal drip present. No pharyngeal swelling, oropharyngeal exudate or posterior oropharyngeal erythema.      Tonsils: No tonsillar exudate or tonsillar abscesses.   Eyes:      General: Lids are normal. Vision grossly intact. Gaze aligned appropriately. No allergic shiner.     Extraocular Movements: Extraocular movements intact.   Cardiovascular:      Rate and Rhythm: Normal rate.  "  Pulmonary:      Effort: Pulmonary effort is normal.      Comments: Patient is speaking in full sentences with no increased respiratory effort. No audible wheezing or stridor.   Musculoskeletal:      Cervical back: Normal range of motion.   Lymphadenopathy:      Cervical: No cervical adenopathy.   Skin:     General: Skin is warm and dry.   Neurological:      Mental Status: She is alert and oriented to person, place, and time.      Coordination: Coordination is intact.      Gait: Gait is intact.   Psychiatric:         Attention and Perception: Attention and perception normal.         Mood and Affect: Mood and affect normal.         Speech: Speech normal.         Behavior: Behavior normal. Behavior is cooperative.               Note: Portions of this record may have been created with voice recognition software. Occasional wrong word or \"sound a like\" substitutions may have occurred due to the inherent limitations of voice recognition software. Please read the chart carefully and recognize, using context, where substitutions have occurred.*      "

## 2024-10-22 NOTE — PATIENT INSTRUCTIONS
Take amoxicillin as prescribed.  Continue over-the-counter antihistamine and Flonase.  If symptoms or not improved in 2 to 3 days follow-up with PCP.  If symptoms worsen or new symptoms develop report to the emergency room immediately.

## 2024-10-23 DIAGNOSIS — U07.1 COVID-19 VIRUS INFECTION: ICD-10-CM

## 2024-10-23 DIAGNOSIS — J31.0 RHINITIS, UNSPECIFIED TYPE: ICD-10-CM

## 2024-10-24 RX ORDER — AZELASTINE HYDROCHLORIDE 137 UG/1
SPRAY, METERED NASAL
Qty: 90 ML | Refills: 0 | Status: SHIPPED | OUTPATIENT
Start: 2024-10-24

## 2024-12-13 ENCOUNTER — TELEPHONE (OUTPATIENT)
Age: 34
End: 2024-12-13

## 2024-12-13 NOTE — TELEPHONE ENCOUNTER
Rec'd call from patient she will like to get an Estimate of the procedure she is having on 01/03/2025 with Dr Piedra or the ICD-9 code so she could get a ball park on what she is going to be paying out of pocket.      Please reach out to patient

## 2025-01-03 ENCOUNTER — PROCEDURE VISIT (OUTPATIENT)
Dept: DERMATOLOGY | Facility: CLINIC | Age: 35
End: 2025-01-03
Payer: COMMERCIAL

## 2025-01-03 VITALS
WEIGHT: 165 LBS | HEIGHT: 66 IN | TEMPERATURE: 99.3 F | SYSTOLIC BLOOD PRESSURE: 127 MMHG | DIASTOLIC BLOOD PRESSURE: 70 MMHG | HEART RATE: 76 BPM | BODY MASS INDEX: 26.52 KG/M2

## 2025-01-03 DIAGNOSIS — D23.9 DYSPLASTIC NEVUS: Primary | ICD-10-CM

## 2025-01-03 PROCEDURE — 88341 IMHCHEM/IMCYTCHM EA ADD ANTB: CPT | Performed by: STUDENT IN AN ORGANIZED HEALTH CARE EDUCATION/TRAINING PROGRAM

## 2025-01-03 PROCEDURE — 88342 IMHCHEM/IMCYTCHM 1ST ANTB: CPT | Performed by: STUDENT IN AN ORGANIZED HEALTH CARE EDUCATION/TRAINING PROGRAM

## 2025-01-03 PROCEDURE — 12031 INTMD RPR S/A/T/EXT 2.5 CM/<: CPT | Performed by: STUDENT IN AN ORGANIZED HEALTH CARE EDUCATION/TRAINING PROGRAM

## 2025-01-03 PROCEDURE — 88305 TISSUE EXAM BY PATHOLOGIST: CPT | Performed by: STUDENT IN AN ORGANIZED HEALTH CARE EDUCATION/TRAINING PROGRAM

## 2025-01-03 PROCEDURE — 11402 EXC TR-EXT B9+MARG 1.1-2 CM: CPT | Performed by: STUDENT IN AN ORGANIZED HEALTH CARE EDUCATION/TRAINING PROGRAM

## 2025-01-03 NOTE — PATIENT INSTRUCTIONS
"  YOUR \"AFTER SURGERY\" REVIEW & INSTRUCTIONS    What to Know About Your Procedure  An \"excision\" was performed today to allow the dermatologist to remove a skin lesion. The procedure involves a local numbing medication and removing the entire lesion (or as much as possible). Typically, the lesion is being removed because it does not look \"normal,\" because it is becoming irritated and traumatized, or for significant appearance reasons.  The skin was cut deeply and then repaired - usually with sutures (stitches).  The removed tissue has been sent to the pathologist who will confirm the diagnosis and verify if the lesion has been completely removed.  Surgical “Vaseline-type” ointment has been applied after the procedure to help create a barrier between your wound and the outside world.     The advantage of using sutures (stitches) to repair a skin excision is that it allows the lesion to heal as quickly as possible with the least amount of scarring and risk of infection, Still, there are some risks and potential complications that you should watch out for that include but are not limited to the following:    Some bleeding is normal at the time of procedure and some bleeding on the gauze bandage after the procedure is normal for the first few days after surgery.  Profuse bleeding or bleeding with swelling and pain is NOT normal and should be reported as detailed below.  Infection is uncommon after skin surgery.  Infection should be reported and is indicated by pain, redness, and discharge of purulent material.  Some pain may occur initially the day after surgery.  Persistent pain or increasing pain days after surgery is not expected and should be reported.  Every effort is made to minimize scar, but location, size, and genetics do play a role in scar appearance.  A surgical wound does not achieve its optimal appearance until 6 months.  There are several treatments available if scarring would be problematic including " "scar creams, silicone pad, laser and scar revision.  Skin discoloration can occur especially in people of color.  Its important to avoid sun on wound in first 6 months after surgery.  Treatment is available if pigment is problematic.  Incomplete removal of the lesion or recurrence of lesion can occur and - depending on the lesion - this would then require further treatment and more surgery.  Nerve damage/numbness and/or loss of function is very rare, but is most likely to occur if the lesion being removed is large or if it is in a \"high risk\" location.  Allergic reaction to lidocaine is rare.  More commonly, epinephrine is used with the lidocaine, and, occasionally, epinephrine (a.k.a., adrenalin) may cause a brief feeling of anxiety or jitteriness.  The person at the microscope (pathologist) may provide additional information that was unexpected. This unexpected finding could prompt the need for additional treatment or evaluation.    At-Home Wound Care  Try NOT to remove the pressure bandage for 48 hours. Keep the area clean and dry while this bandage is on.   After removing the bandage for the first time, gently clean the area with soap and water. If the bandage is difficult to remove, getting the bandage wet in the shower will sometimes help soften the adhesive and allow it to be removed more easily.   You will now need to cleanse this area daily in the shower with gentle soap. There is no need to scrub the area. You will need to apply plain Vaseline ointment (this is over the counter and not a prescription) to the site for up to 2 weeks followed by a clean appropriately sized bandage to area.  Non stick dressing and paper tape (or Hypafix) are recommended for sensitive skin but a bandaid is fine if it covers the area well.  In general, sutures (stitches) are removed in about 5-7 days for face wounds and in about 12-14 days for the body wounds.  Your dermatologist wants you to return for suture removal in 14 " "DAYS.     General Restrictions  For TWO (2) DAYS:  You will need to take it very easy as this time is highest risk for bleeding. Being a \"couch potato\" during these two days is generally recommended.   For surgeries on the face/neck/scalp: Avoid leaning down to pick things up off the floor as this brings blood up to your head. Instead, squat down to pick things up.     For TWO WEEKS (14 DAYS):   No heavy lifting (anything greater than 10 pounds)   You can start to do slow, gentle activities such as slow walking but nothing to increase your heart rate and blood pressure too much (such as cardiovascular exercise).  It is important to take it easy as there is still a risk for bleeding and a high risk popping of stitches open during this time.     Site Specific Restrictions  If we did surgery near your eyes (including the nose, forehead, front part of your scalp, cheeks): It is VERY common to get a large amount of swelling around your eyes (puffy eyes). Although less frequent, this can be enough to swell your eyes shut and can also come along with bruising. This should not hurt and is very expected and normal. It is typically worst at ~ 3 days out from your surgery and dramatically better 1 week post-operatively.   If we did surgery around your nose: No blowing your nose as this puts you at higher risk of popping stitches durign this time. Instead dab under your nose with a tissue or use a Q-tip inside your nose.  If we did surgery on the skin above or below your lip or your lip itself: No sipping from straws as this uses a lot of the muscles around your mouth and increases the risk of popping stitches during this time.    Managing Your Pain After Surgery  You can expect to have some pain after surgery. This is normal. The pain is typically worse the first two days after surgery, and quickly begins to get better.   You can use heating pads or ice packs on your incisions to help reduce your pain.   The best strategy for " "controlling your pain after surgery is \"around the clock\" pain control. You can take \"over-the-counter\" (non-prescription) Acetaminophen (Tylenol) for discomfort, unless you have been told not to by your physician.  If you are taking Tylenol at the maximum dose, you can alternate Tylenol with Advil/Motrin (ibuprofen) as long as there are no contraindications.  Alternating these medications with each other (I.e., Tylenol followed by Motrin/Advil) allows you to maximize your pain control.  To alternate these medications properly, you will take a dose of pain medication every three hours, alternating Tylenol (acetaminophen) and Advil/Motrin (ibuprofen).  Start by taking 650 mg of Tylenol (2 pills of 325 mg)  3 hours later take 600 mg of Motrin (3 pills of 200 mg)  3 hours after taking the Motrin take 650 mg of Tylenol  3 hours after that take 600 mg of Motrin.    As an example, if your first dose of Tylenol (acetaminophen) is at 12:00 PM, then you would alternate with Motrin as directed below, continuing usually for no more than a total of 48 hours straight:     12:00 PM  Tylenol 650 mg (2 pills of 325 mg)    3:00 PM  Motrin 600 mg (3 pills of 200 mg)    6:00 PM  Tylenol 650 mg (2 pills of 325 mg)    9:00 PM  Motrin 600 mg (3 pills of 200 mg)      WARNING:  Do NOT take more than 4000 mg of Tylenol or 3200 mg of Motrin in a \"24-hour\" period.       What if you still have pain?    If you have pain that is not controlled with the over-the-counter pain medications (Tylenol and Motrin/Advil), do not hesitate to call our staff using the number provided. We will help make sure you are managing your pain in the best way possible, and if necessary, we can provide a prescription for additional pain medication.     Call our office IMMEDIATELY with any signs of wound infection.  This includes fever, chills, increasing redness, warmth, tenderness, severe discomfort/pain, or pus or foul smell coming from the wound. [facility] [facility] can be directly at [phone] (SKIN) and ask for the on-call Dermatologist covering surgery/Mohs.    If Bleeding is Noticed  If bleeding is soaking through the bandage, remove the bandage and see where the bleeding is coming from.  Place a clean cloth over the area and apply firm pressure directly to the area that is bleeding for thirty minutes.    Check the wound ONLY after 30 minutes of direct pressure; do not cheat and sneak a peak, as that does not count (i.e., resets the clock back to zero).  If bleeding persists after 30 minutes of legitimate direct pressure, then try one more round of direct pressure to the area.    Should bleeding become heavier or not stop after the second application of direct pressure for 30 minutes, then call [facility] directly at [phone] (SKIN) and ask to speak with the on-call Dermatologist covering surgery/Mohs.  If after hours, go to your nearest Emergency Room or Urgent Care and have the team call [facility] directly at [phone] (SKIN); you will be connected to our after hours team.

## 2025-01-03 NOTE — PROGRESS NOTES
"  PROCEDURE:  EXCISION NOTE     Procedural Plan:     Attending:   Assistant:  Juliette Cohen  Lesion Anatomic Location (use description from previous biopsy if applicable): Right upper back   Pre-Op Diagnosis: Lentiginous compound dysplastic nevus with severe cytologic atypia   Lesion is being treated as \"benign\" or \"MALIGNANT\": benign  Accession Number of any associated previous biopsy/excision: [id number]    Written and verbal (witnessed) informed consent was obtained. We discussed that \"excision\" is a method of removing lesions, both benign and malignant lesions.  A portion of normal skin is often taken to ensure completeness of removal.  I reviewed that this procedure will include numbing the area, cutting around and under the skin lesion, undermining (\"freeing up\") surrounding tissue, and closing the wound with sutures (stitches) both inside and out.  Risks include and are not limited to the following:  Bleeding, pain, infection, scarring, recurrence, more required treatment, no additional information, numbness and/or loss of function (if nerves are damaged).  These risks were considered against the benefits that we discussed, and the patient opted to continue with the procedure. It was discussed with patient that every effort is made to minimize scarring, but scarring is influenced also by extrinsic factor such as location, age and genetics.     Procedural Time Out:      Correct patient? yes  Correct site per Clinic Report? yes  Correct site per previous Path Report? yes  Correct site per Patient's recollection? yes    Anesthesia:      Local anesthesia: 1% xylocaine with epi     Excision Description:      Post-Op Diagnosis: Same as Pre-Op Diagnosis (above)  Pre-op Size: 0.6 cm x 0.8 cm  Margins (enter \"0\" for lipoma/cyst or similar diagnosis): 0.3 cm  TOTAL POSTOPERATIVE DEFECT SIZE (I.e., Pre-op Size + Margins on both sides):  1.2 cm x 1.4 cm    The patient was seated in the procedure/exam room, " "anesthetized locally, prepped and draped in the usual fashion. Using a #15 blade with a scalpel, the lesion was excised in elliptical fashion.     REQUIRED [facility] MELANOMA DATA      This procedure was not performed to treat primary cutaneous melanoma through wide local excision      Closure Description:      The specific type of closure that was utilized:     INTERMEDIATE Closure  INTERMEDIATE CLOSURE     The patient was brought back into the procedure room, anesthetized locally, prepped and draped in the usual fashion. Using a #15 blade with a scalpel, the lesion was excised in elliptical fashion. The wound was  undermined in the fascial plane.  Purpose of undermining was to decrease wound tension and facilitate closure. Hemostasis was achieved with light electrocoagulation.    The wound was closed with subcutaneous sutures as follows:    Deep Suture Throw, Size, and Type (select all that apply):  Interrupted Deeps; 4-0; vicryl     Epidermal edge closure was accomplished with superficial sutures as follows:    Superficial Suture Throw, Size and Type (select all that apply):  Simple Interrupted; 4-0; Prolene    FINAL LENGTH OF CLOSURE (please enter a length even for lipoma/cyst or similar diagnosis): 2.2 cm       Postoperative Care:      The wound was cleaned with sterile saline, dried off, surgical vaseline ointment was applied, and the wound was covered. A pressure dressing was applied for stabilization and light pressure.    Estimated blood loss:  Less than 3ml.  Complications: none  Post-op medications: none  Additional notes: none    Discharge Plans:      Discharge plans: Plan for return to us for suture removal, as scheduled in 14 days.   Patient condition at discharge: STABLE    The patient was given detailed oral and written instructions on postoperative care as detailed in consent. We urged the patient to call us if any problems or question should arise.         Please complete this section and then \"cut and " "paste\" it into the Patient Instructions section.  These notes should be printed and shared directly with the patient for review PRIOR TO leaving our office.         YOUR \"AFTER SURGERY\" REVIEW & INSTRUCTIONS    What to Know About Your Procedure  An \"excision\" was performed today to allow the dermatologist to remove a skin lesion. The procedure involves a local numbing medication and removing the entire lesion (or as much as possible). Typically, the lesion is being removed because it does not look \"normal,\" because it is becoming irritated and traumatized, or for significant appearance reasons.  The skin was cut deeply and then repaired - usually with sutures (stitches).  The removed tissue has been sent to the pathologist who will confirm the diagnosis and verify if the lesion has been completely removed.  Surgical “Vaseline-type” ointment has been applied after the procedure to help create a barrier between your wound and the outside world.     The advantage of using sutures (stitches) to repair a skin excision is that it allows the lesion to heal as quickly as possible with the least amount of scarring and risk of infection, Still, there are some risks and potential complications that you should watch out for that include but are not limited to the following:    Some bleeding is normal at the time of procedure and some bleeding on the gauze bandage after the procedure is normal for the first few days after surgery.  Profuse bleeding or bleeding with swelling and pain is NOT normal and should be reported as detailed below.  Infection is uncommon after skin surgery.  Infection should be reported and is indicated by pain, redness, and discharge of purulent material.  Some pain may occur initially the day after surgery.  Persistent pain or increasing pain days after surgery is not expected and should be reported.  Every effort is made to minimize scar, but location, size, and genetics do play a role in scar " "appearance.  A surgical wound does not achieve its optimal appearance until 6 months.  There are several treatments available if scarring would be problematic including scar creams, silicone pad, laser and scar revision.  Skin discoloration can occur especially in people of color.  Its important to avoid sun on wound in first 6 months after surgery.  Treatment is available if pigment is problematic.  Incomplete removal of the lesion or recurrence of lesion can occur and - depending on the lesion - this would then require further treatment and more surgery.  Nerve damage/numbness and/or loss of function is very rare, but is most likely to occur if the lesion being removed is large or if it is in a \"high risk\" location.  Allergic reaction to lidocaine is rare.  More commonly, epinephrine is used with the lidocaine, and, occasionally, epinephrine (a.k.a., adrenalin) may cause a brief feeling of anxiety or jitteriness.  The person at the microscope (pathologist) may provide additional information that was unexpected. This unexpected finding could prompt the need for additional treatment or evaluation.    At-Home Wound Care  Try NOT to remove the pressure bandage for 48 hours. Keep the area clean and dry while this bandage is on.   After removing the bandage for the first time, gently clean the area with soap and water. If the bandage is difficult to remove, getting the bandage wet in the shower will sometimes help soften the adhesive and allow it to be removed more easily.   You will now need to cleanse this area daily in the shower with gentle soap. There is no need to scrub the area. You will need to apply plain Vaseline ointment (this is over the counter and not a prescription) to the site for up to 2 weeks followed by a clean appropriately sized bandage to area.  Non stick dressing and paper tape (or Hypafix) are recommended for sensitive skin but a bandaid is fine if it covers the area well.  In general, sutures " "(stitches) are removed in about 5-7 days for face wounds and in about 12-14 days for the body wounds.  Your dermatologist wants you to return for suture removal in 14 DAYS.     General Restrictions  For TWO (2) DAYS:  You will need to take it very easy as this time is highest risk for bleeding. Being a \"couch potato\" during these two days is generally recommended.   For surgeries on the face/neck/scalp: Avoid leaning down to pick things up off the floor as this brings blood up to your head. Instead, squat down to pick things up.     For TWO WEEKS (14 DAYS):   No heavy lifting (anything greater than 10 pounds)   You can start to do slow, gentle activities such as slow walking but nothing to increase your heart rate and blood pressure too much (such as cardiovascular exercise).  It is important to take it easy as there is still a risk for bleeding and a high risk popping of stitches open during this time.     Site Specific Restrictions  If we did surgery near your eyes (including the nose, forehead, front part of your scalp, cheeks): It is VERY common to get a large amount of swelling around your eyes (puffy eyes). Although less frequent, this can be enough to swell your eyes shut and can also come along with bruising. This should not hurt and is very expected and normal. It is typically worst at ~ 3 days out from your surgery and dramatically better 1 week post-operatively.   If we did surgery around your nose: No blowing your nose as this puts you at higher risk of popping stitches durign this time. Instead dab under your nose with a tissue or use a Q-tip inside your nose.  If we did surgery on the skin above or below your lip or your lip itself: No sipping from straws as this uses a lot of the muscles around your mouth and increases the risk of popping stitches during this time.    Managing Your Pain After Surgery  You can expect to have some pain after surgery. This is normal. The pain is typically worse the first " "two days after surgery, and quickly begins to get better.   You can use heating pads or ice packs on your incisions to help reduce your pain.   The best strategy for controlling your pain after surgery is \"around the clock\" pain control. You can take \"over-the-counter\" (non-prescription) Acetaminophen (Tylenol) for discomfort, unless you have been told not to by your physician.  If you are taking Tylenol at the maximum dose, you can alternate Tylenol with Advil/Motrin (ibuprofen) as long as there are no contraindications.  Alternating these medications with each other (I.e., Tylenol followed by Motrin/Advil) allows you to maximize your pain control.  To alternate these medications properly, you will take a dose of pain medication every three hours, alternating Tylenol (acetaminophen) and Advil/Motrin (ibuprofen).  Start by taking 650 mg of Tylenol (2 pills of 325 mg)  3 hours later take 600 mg of Motrin (3 pills of 200 mg)  3 hours after taking the Motrin take 650 mg of Tylenol  3 hours after that take 600 mg of Motrin.    As an example, if your first dose of Tylenol (acetaminophen) is at 12:00 PM, then you would alternate with Motrin as directed below, continuing usually for no more than a total of 48 hours straight:     12:00 PM  Tylenol 650 mg (2 pills of 325 mg)    3:00 PM  Motrin 600 mg (3 pills of 200 mg)    6:00 PM  Tylenol 650 mg (2 pills of 325 mg)    9:00 PM  Motrin 600 mg (3 pills of 200 mg)      WARNING:  Do NOT take more than 4000 mg of Tylenol or 3200 mg of Motrin in a \"24-hour\" period.       What if you still have pain?    If you have pain that is not controlled with the over-the-counter pain medications (Tylenol and Motrin/Advil), do not hesitate to call our staff using the number provided. We will help make sure you are managing your pain in the best way possible, and if necessary, we can provide a prescription for additional pain medication.     Call our office IMMEDIATELY with any signs of wound " infection.  This includes fever, chills, increasing redness, warmth, tenderness, severe discomfort/pain, or pus or foul smell coming from the wound. [facility] can be directly at [phone] (SKIN) and ask for the on-call Dermatologist covering surgery/Mohs.    If Bleeding is Noticed  If bleeding is soaking through the bandage, remove the bandage and see where the bleeding is coming from.  Place a clean cloth over the area and apply firm pressure directly to the area that is bleeding for thirty minutes.    Check the wound ONLY after 30 minutes of direct pressure; do not cheat and sneak a peak, as that does not count (i.e., resets the clock back to zero).  If bleeding persists after 30 minutes of legitimate direct pressure, then try one more round of direct pressure to the area.    Should bleeding become heavier or not stop after the second application of direct pressure for 30 minutes, then call . [facility] directly at [phone] (SKIN) and ask to speak with the on-call Dermatologist covering surgery/Mohs.  If after hours, go to your nearest Emergency Room or Urgent Care and have the team call [facility] directly at [phone] (SKIN); you will be connected to our after hours team.             Scribe Attestation    I,:  Juliette Cohen am acting as a scribe while in the presence of the attending physician.:       I,:  Harshad Piedra, DO personally performed the services described in this documentation    as scribed in my presence.:

## 2025-01-08 PROCEDURE — 88341 IMHCHEM/IMCYTCHM EA ADD ANTB: CPT | Performed by: STUDENT IN AN ORGANIZED HEALTH CARE EDUCATION/TRAINING PROGRAM

## 2025-01-08 PROCEDURE — 88305 TISSUE EXAM BY PATHOLOGIST: CPT | Performed by: STUDENT IN AN ORGANIZED HEALTH CARE EDUCATION/TRAINING PROGRAM

## 2025-01-08 PROCEDURE — 88342 IMHCHEM/IMCYTCHM 1ST ANTB: CPT | Performed by: STUDENT IN AN ORGANIZED HEALTH CARE EDUCATION/TRAINING PROGRAM

## 2025-01-13 ENCOUNTER — RESULTS FOLLOW-UP (OUTPATIENT)
Dept: DERMATOLOGY | Facility: CLINIC | Age: 35
End: 2025-01-13

## 2025-01-17 ENCOUNTER — CLINICAL SUPPORT (OUTPATIENT)
Dept: DERMATOLOGY | Facility: CLINIC | Age: 35
End: 2025-01-17

## 2025-01-17 DIAGNOSIS — Z48.02 ENCOUNTER FOR REMOVAL OF SUTURES: Primary | ICD-10-CM

## 2025-01-17 PROCEDURE — RECHECK

## 2025-01-17 NOTE — PROGRESS NOTES
"Suture removal    Date/Time: 1/17/2025 8:15 AM    Performed by: Jazmín Willoughby RN  Authorized by: Jake Mclean MD  Universal Protocol:  procedure performed by consultantConsent: Verbal consent obtained. Written consent not obtained.  Risks and benefits: risks, benefits and alternatives were discussed  Consent given by: patient  Time out: Immediately prior to procedure a \"time out\" was called to verify the correct patient, procedure, equipment, support staff and site/side marked as required.  Timeout called at: 1/17/2025 8:08 AM.  Patient understanding: patient states understanding of the procedure being performed  Patient consent: the patient's understanding of the procedure matches consent given  Procedure consent: procedure consent matches procedure scheduled  Relevant documents: relevant documents not present or verified  Test results: test results not available  Site marked: the operative site was not marked  Radiology Images displayed and confirmed. If images not available, report reviewed: imaging studies not available  Patient identity confirmed: verbally with patient      Patient location:  Clinic  Location:     Laterality:  Right    Location:  Trunk    Trunk location: upper back.  Procedure details:     Tools used:  Suture removal kit    Wound appearance:  No sign(s) of infection, good wound healing, clean, moist and pink  Post-procedure details:     Post-removal:  Band-Aid applied (Vaseline applied)    Patient tolerance of procedure:  Tolerated well, no immediate complications      Before suture removal     After suture removal   "

## 2025-01-23 ENCOUNTER — TELEPHONE (OUTPATIENT)
Age: 35
End: 2025-01-23

## 2025-01-27 ENCOUNTER — TELEPHONE (OUTPATIENT)
Age: 35
End: 2025-01-27

## 2025-01-27 NOTE — TELEPHONE ENCOUNTER
"Behavioral Health Outpatient Intake Questions    Referred By   : Self Referral     Please advise interviewee that they need to answer all questions truthfully to allow for best care, and any misrepresentations of information may affect their ability to be seen at this clinic   => Was this discussed? Yes     If Minor Child (under age 18)    Who is/are the legal guardian(s) of the child?     Is there a custody agreement? No     If \"YES\"- Custody orders must be obtained prior to scheduling the first appointment  In addition, Consent to Treatment must be signed by all legal guardians prior to scheduling the first appointment    If \"NO\"- Consent to Treatment must be signed by all legal guardians prior to scheduling the first appointment    Behavioral Health Outpatient Intake History -     Presenting Problem (in patient's own words):     Pt was doing CBT therapy in the past. Pt is diagnosed with ADHD    Are there any communication barriers for this patient?     Yes                                               If yes, please describe barriers: ADHD  If there is a unique situation, please refer to Saman Sheriff/Radha Kim for final determination.    Are you taking any psychiatric medications? Yes     If \"YES\" -What are they Adderral     If \"YES\" -Who prescribes? PCP    Has the Patient previously received outpatient Talk Therapy or Medication Management from [facility]  No        If \"YES\"- When, Where and with Whom?         If \"NO\" -Has Patient received these services elsewhere?       If \"YES\" -When, Where, and with Whom?    Has the Patient abused alcohol or other substances in the last 6 months ? No  No concerns of substance abuse are reported.     If \"YES\" -What substance, How much, How often?     If illegal substance: Refer to [facility] (for SHARONA) or SHARE[facility]MAT Offices.   If Alcohol in excess of 10 drinks per week:  Refer to [facility] (for SHARONA) or SHARE[facility]MAT Offices    Legal History-     Is this treatment court " "ordered? No   If \"yes \"send to :  Talk Therapy : Send to Saman Sheriff for final determination   Med Management: Send to Dr. Hoffmann for final determination     Has the Patient been convicted of a felony?  NO   If \"Yes\" send to -When, What?  Talk Therapy: Send to Saman Sheriff for final determination   Med Management: Send to Dr. Hoffmann for final determination     ACCEPTED as a patient Yes  If \"Yes\" Appointment Date: 4/24/2025    Referred Elsewhere? No  If “Yes” - (Where? Ex: [facility], [facility], [facility], [facility], etc.)       Name of Insurance Co:[employer]  Insurance ID#[id number]  Insurance Phone #  If ins is primary or secondary?Primary   If patient is a minor, parents information such as Name, D.O.B of guarantor.  "

## 2025-01-29 ENCOUNTER — OFFICE VISIT (OUTPATIENT)
Dept: INTERNAL MEDICINE CLINIC | Facility: CLINIC | Age: 35
End: 2025-01-29
Payer: COMMERCIAL

## 2025-01-29 VITALS
HEIGHT: 66 IN | SYSTOLIC BLOOD PRESSURE: 112 MMHG | HEART RATE: 78 BPM | TEMPERATURE: 96.4 F | OXYGEN SATURATION: 100 % | WEIGHT: 163 LBS | DIASTOLIC BLOOD PRESSURE: 60 MMHG | BODY MASS INDEX: 26.2 KG/M2

## 2025-01-29 DIAGNOSIS — J31.0 RHINITIS, UNSPECIFIED TYPE: Primary | ICD-10-CM

## 2025-01-29 DIAGNOSIS — D23.5 DYSPLASTIC NEVUS OF TRUNK: ICD-10-CM

## 2025-01-29 DIAGNOSIS — S80.261S: ICD-10-CM

## 2025-01-29 DIAGNOSIS — E53.8 VITAMIN B12 DEFICIENCY: ICD-10-CM

## 2025-01-29 DIAGNOSIS — F41.9 ANXIETY: ICD-10-CM

## 2025-01-29 DIAGNOSIS — Z72.0 VAPES NICOTINE CONTAINING SUBSTANCE: ICD-10-CM

## 2025-01-29 DIAGNOSIS — Z00.00 LABORATORY EXAMINATION ORDERED AS PART OF A ROUTINE GENERAL MEDICAL EXAMINATION: ICD-10-CM

## 2025-01-29 DIAGNOSIS — Z00.00 ANNUAL PHYSICAL EXAM: ICD-10-CM
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
## 2025-01-29 DIAGNOSIS — F90.2 ATTENTION DEFICIT HYPERACTIVITY DISORDER (ADHD), COMBINED TYPE: ICD-10-CM

## 2025-01-29 PROCEDURE — 99214 OFFICE O/P EST MOD 30 MIN: CPT | Performed by: INTERNAL MEDICINE

## 2025-01-29 PROCEDURE — 99395 PREV VISIT EST AGE 18-39: CPT | Performed by: INTERNAL MEDICINE

## 2025-01-29 RX ORDER — DEXTROAMPHETAMINE SACCHARATE, AMPHETAMINE ASPARTATE, DEXTROAMPHETAMINE SULFATE AND AMPHETAMINE SULFATE 2.5; 2.5; 2.5; 2.5 MG/1; MG/1; MG/1; MG/1
10 TABLET ORAL DAILY
Qty: 30 TABLET | Refills: 0 | Status: SHIPPED | OUTPATIENT
Start: 2025-01-29

## 2025-01-29 NOTE — PROGRESS NOTES
Name: Leida Jean      : 1990      MRN: [id number]  Encounter Provider: Carlotta Barone MD  Encounter Date: 2025   Encounter department: [facility]  :  Assessment & Plan  Rhinitis, unspecified type  Recommend to change antihistamine from Zyrtec to Allegra or Claritin.  Continue azelastine daily, Flonase did not help.  Recommend saline rinse daily.  Discussed allergy referral. Agrees to see ENT.  Orders:  •  Ambulatory Referral to Otolaryngology; Future    Attention deficit hyperactivity disorder (ADHD), combined type  Keep appointment with psychiatrist in April.  Takes Adderall about once a week.  PDMP reviewed.  Orders:  •  amphetamine-dextroamphetamine (ADDERALL, 10MG,) 10 mg tablet; Take 1 tablet (10 mg total) by mouth daily Max Daily Amount: 10 mg    Anxiety  Coping. Previously took Xanax for panic attacks.  Orders:  •  Comprehensive metabolic panel; Future  •  TSH, 3rd generation with Free T4 reflex; Future    Dysplastic nevus of trunk  Saw dermatology.       Vitamin B12 deficiency    Orders:  •  CBC and differential; Future  •  Vitamin B12; Future    Tick bite of right knee, sequela  Discussed utility of blood work.  No recent tick exposure.  Orders:  •  Lyme Total AB W Reflex to IGM/IGG; Future    Vapes nicotine containing substance  ?Still smoking       Laboratory examination ordered as part of a routine general medical examination    Orders:  •  CBC and differential; Future  •  Comprehensive metabolic panel; Future  •  Lipid panel; Future  •  TSH, 3rd generation with Free T4 reflex; Future  •  Vitamin B12; Future  •  Lyme Total AB W Reflex to IGM/IGG; Future    Annual physical exam  Declined vaccination. Schedule appointment with gynecology.       Follow up in 6 months or as needed.         History of Present Illness   She reports ongoing sinus congestion since she had COVID last year.  She started working from home at that time and felt the congestion never  "went away.  She had tried Flonase in the past, currently using azelastine.  She reports sinus congestion would sometimes develop into sinus pain and discomfort.  Symptoms would go away on its own.  She reports no recent fever or chills, no sore throat.  She reports frequent postnasal drip.  She lives in an old home, not sure if she has mold in the house.  She reports symptoms improve when she is not in the house.    She has 2 dogs which she walks daily.  She had found several ticks on them during the summer.  She did have a tick on her for less than 24 hours in her right knee.  Area was red and swollen for a few days.  She reports no other rash or symptoms.  She is concerned about Lyme disease.    She was waiting for an appointment with psychiatry, limited due to her insurance.  She has a scheduled 1 in April.  She takes Adderall about once a week only.  She was previously on Xanax for her panic attacks.      Review of Systems   Constitutional:  Negative for appetite change and fatigue.   HENT:  Negative for congestion, ear pain and postnasal drip.    Eyes:  Negative for visual disturbance.   Respiratory:  Negative for cough and shortness of breath.    Cardiovascular:  Negative for chest pain and leg swelling.   Gastrointestinal:  Negative for abdominal pain, constipation and diarrhea.   Genitourinary:  Negative for dysuria, frequency and urgency.   Musculoskeletal:  Negative for arthralgias and myalgias.   Skin:  Negative for rash and wound.   Neurological:  Negative for dizziness, numbness and headaches.   Hematological:  Does not bruise/bleed easily.   Psychiatric/Behavioral:  Negative for confusion. The patient is nervous/anxious.        Objective   /60   Pulse 78   Temp (!) 96.4 °F (35.8 °C)   Ht 5' 6\" (1.676 m)   Wt 73.9 kg (163 lb)   SpO2 100%   BMI 26.31 kg/m²      Physical Exam  Vitals and nursing note reviewed.   Constitutional:       General: She is not in acute distress.     Appearance: She " is well-developed.   HENT:      Head: Normocephalic and atraumatic.      Mouth/Throat:      Mouth: Mucous membranes are moist.   Eyes:      Pupils: Pupils are equal, round, and reactive to light.   Cardiovascular:      Rate and Rhythm: Normal rate and regular rhythm.      Heart sounds: Normal heart sounds.   Pulmonary:      Effort: Pulmonary effort is normal.      Breath sounds: Normal breath sounds. No wheezing.   Abdominal:      General: Bowel sounds are normal.      Palpations: Abdomen is soft.   Musculoskeletal:         General: No swelling.      Right lower leg: No edema.      Left lower leg: No edema.   Skin:     General: Skin is warm.      Findings: No rash.   Neurological:      General: No focal deficit present.      Mental Status: She is alert and oriented to person, place, and time.   Psychiatric:         Mood and Affect: Mood and affect normal. Mood is not anxious or depressed.         Behavior: Behavior normal.           Labs & imaging results reviewed with patient.

## 2025-01-29 NOTE — ASSESSMENT & PLAN NOTE
Recommend to change antihistamine from Zyrtec to Allegra or Claritin.  Continue azelastine daily, Flonase did not help.  Recommend saline rinse daily.  Discussed allergy referral. Agrees to see ENT.  Orders:  •  Ambulatory Referral to Otolaryngology; Future

## 2025-01-29 NOTE — ASSESSMENT & PLAN NOTE
Keep appointment with psychiatrist in April.  Takes Adderall about once a week.  PDMP reviewed.  Orders:  •  amphetamine-dextroamphetamine (ADDERALL, 10MG,) 10 mg tablet; Take 1 tablet (10 mg total) by mouth daily Max Daily Amount: 10 mg

## 2025-01-29 NOTE — ASSESSMENT & PLAN NOTE
Coping. Previously took Xanax for panic attacks.  Orders:  •  Comprehensive metabolic panel; Future  •  TSH, 3rd generation with Free T4 reflex; Future

## 2025-02-03 ENCOUNTER — TELEPHONE (OUTPATIENT)
Dept: PSYCHIATRY | Facility: CLINIC | Age: 35
End: 2025-02-03

## 2025-02-03 NOTE — TELEPHONE ENCOUNTER
One week follow up call for New Patient appointment with Grace Pagan PA-C on 04/24/2025 was made on 02/03/2025. Writer informed patient of New Patient paperwork needing to be completed 5 days prior to the appointment. Writer confirmed paperwork has been sent via [employer].    Appointment was made on: 01/27/2025

## 2025-02-08 LAB
ALBUMIN SERPL-MCNC: 4.1 G/DL (ref 3.6–5.1)
ALBUMIN/GLOB SERPL: 1.8 (CALC) (ref 1–2.5)
ALP SERPL-CCNC: 36 U/L (ref 31–125)
ALT SERPL-CCNC: 5 U/L (ref 6–29)
AST SERPL-CCNC: 11 U/L (ref 10–30)
B BURGDOR AB SER IA-ACNC: <0.9 INDEX
BASOPHILS # BLD AUTO: 32 CELLS/UL (ref 0–200)
BASOPHILS NFR BLD AUTO: 0.8 %
BILIRUB SERPL-MCNC: 0.6 MG/DL (ref 0.2–1.2)
BUN SERPL-MCNC: 15 MG/DL (ref 7–25)
BUN/CREAT SERPL: ABNORMAL (CALC) (ref 6–22)
CALCIUM SERPL-MCNC: 8.9 MG/DL (ref 8.6–10.2)
CHLORIDE SERPL-SCNC: 105 MMOL/L (ref 98–110)
CHOLEST SERPL-MCNC: 192 MG/DL
CHOLEST/HDLC SERPL: 2.3 (CALC)
CO2 SERPL-SCNC: 23 MMOL/L (ref 20–32)
CREAT SERPL-MCNC: 0.66 MG/DL (ref 0.5–0.97)
EOSINOPHIL # BLD AUTO: 184 CELLS/UL (ref 15–500)
EOSINOPHIL NFR BLD AUTO: 4.6 %
ERYTHROCYTE [DISTWIDTH] IN BLOOD BY AUTOMATED COUNT: 13.1 % (ref 11–15)
GFR/BSA.PRED SERPLBLD CYS-BASED-ARV: 118 ML/MIN/1.73M2
GLOBULIN SER CALC-MCNC: 2.3 G/DL (CALC) (ref 1.9–3.7)
GLUCOSE SERPL-MCNC: 86 MG/DL (ref 65–99)
HCT VFR BLD AUTO: 39.2 % (ref 35–45)
HDLC SERPL-MCNC: 82 MG/DL
HGB BLD-MCNC: 12.5 G/DL (ref 11.7–15.5)
LDLC SERPL CALC-MCNC: 95 MG/DL (CALC)
LYMPHOCYTES # BLD AUTO: 1500 CELLS/UL (ref 850–3900)
LYMPHOCYTES NFR BLD AUTO: 37.5 %
MCH RBC QN AUTO: 27.7 PG (ref 27–33)
MCHC RBC AUTO-ENTMCNC: 31.9 G/DL (ref 32–36)
MCV RBC AUTO: 86.9 FL (ref 80–100)
MONOCYTES # BLD AUTO: 264 CELLS/UL (ref 200–950)
MONOCYTES NFR BLD AUTO: 6.6 %
NEUTROPHILS # BLD AUTO: 2020 CELLS/UL (ref 1500–7800)
NEUTROPHILS NFR BLD AUTO: 50.5 %
NONHDLC SERPL-MCNC: 110 MG/DL (CALC)
PLATELET # BLD AUTO: 237 THOUSAND/UL (ref 140–400)
PMV BLD REES-ECKER: 10 FL (ref 7.5–12.5)
POTASSIUM SERPL-SCNC: 4.5 MMOL/L (ref 3.5–5.3)
PROT SERPL-MCNC: 6.4 G/DL (ref 6.1–8.1)
RBC # BLD AUTO: 4.51 MILLION/UL (ref 3.8–5.1)
SODIUM SERPL-SCNC: 136 MMOL/L (ref 135–146)
TRIGL SERPL-MCNC: 65 MG/DL
TSH SERPL-ACNC: 1.91 MIU/L
VIT B12 SERPL-MCNC: 566 PG/ML (ref 200–1100)
WBC # BLD AUTO: 4 THOUSAND/UL (ref 3.8–10.8)

## 2025-02-12 ENCOUNTER — RESULTS FOLLOW-UP (OUTPATIENT)
Dept: INTERNAL MEDICINE CLINIC | Facility: CLINIC | Age: 35
End: 2025-02-12

## 2025-02-28 ENCOUNTER — TELEPHONE (OUTPATIENT)
Age: 35
End: 2025-02-28

## 2025-02-28 NOTE — TELEPHONE ENCOUNTER
Contacted patient for Talk Therapy  wait list to verify needs of services in attempts to update wait list and offer outside resource guide. Writer verified N/A - NO ANSWER. Writer NEMOM and left callback number [phone]; option 3.    1st call attempt

## 2025-03-17 ENCOUNTER — OFFICE VISIT (OUTPATIENT)
Dept: DERMATOLOGY | Facility: CLINIC | Age: 35
End: 2025-03-17
Payer: COMMERCIAL

## 2025-03-17 VITALS — TEMPERATURE: 97 F | BODY MASS INDEX: 26.47 KG/M2 | WEIGHT: 164 LBS

## 2025-03-17 DIAGNOSIS — D22.5 MULTIPLE BENIGN MELANOCYTIC NEVI OF UPPER AND LOWER EXTREMITIES AND TRUNK: Primary | ICD-10-CM

## 2025-03-17 DIAGNOSIS — D48.5 NEOPLASM OF UNCERTAIN BEHAVIOR OF SKIN: ICD-10-CM

## 2025-03-17 DIAGNOSIS — L72.11 PILAR CYST: ICD-10-CM

## 2025-03-17 DIAGNOSIS — D22.71 MULTIPLE BENIGN MELANOCYTIC NEVI OF UPPER AND LOWER EXTREMITIES AND TRUNK: Primary | ICD-10-CM

## 2025-03-17 DIAGNOSIS — D22.72 MULTIPLE BENIGN MELANOCYTIC NEVI OF UPPER AND LOWER EXTREMITIES AND TRUNK: Primary | ICD-10-CM

## 2025-03-17 DIAGNOSIS — D22.62 MULTIPLE BENIGN MELANOCYTIC NEVI OF UPPER AND LOWER EXTREMITIES AND TRUNK: Primary | ICD-10-CM

## 2025-03-17 DIAGNOSIS — D22.61 MULTIPLE BENIGN MELANOCYTIC NEVI OF UPPER AND LOWER EXTREMITIES AND TRUNK: Primary | ICD-10-CM

## 2025-03-17 DIAGNOSIS — L30.1 DYSHIDROSIS: ICD-10-CM

## 2025-03-17 PROCEDURE — 99214 OFFICE O/P EST MOD 30 MIN: CPT | Performed by: REGISTERED NURSE

## 2025-03-17 PROCEDURE — 11102 TANGNTL BX SKIN SINGLE LES: CPT | Performed by: REGISTERED NURSE

## 2025-03-17 PROCEDURE — 88305 TISSUE EXAM BY PATHOLOGIST: CPT | Performed by: PATHOLOGY

## 2025-03-17 RX ORDER — TIMOLOL HEMIHYDRATE 5 MG/ML
SOLUTION/ DROPS OPHTHALMIC
Qty: 15 ML | Refills: 3 | Status: SHIPPED | OUTPATIENT
Start: 2025-03-17

## 2025-03-17 RX ORDER — TRIAMCINOLONE ACETONIDE 1 MG/G
OINTMENT TOPICAL 2 TIMES DAILY
Qty: 30 G | Refills: 0 | Status: SHIPPED | OUTPATIENT
Start: 2025-03-17

## 2025-03-17 NOTE — PROGRESS NOTES
"[facility] Dermatology Clinic Note     Patient Name: Leida Jean  Encounter Date: 3/17/25     Have you been cared for by a [facility] Dermatologist in the last 3 years and, if so, which description applies to you?    Yes.  I have been here within the last 3 years, and my medical history has NOT changed since that time.  I am FEMALE/of child-bearing potential.    REVIEW OF SYSTEMS:  Have you recently had or currently have any of the following? No changes in my recent health.   PAST MEDICAL HISTORY:  Have you personally ever had or currently have any of the following?  If \"YES,\" then please provide more detail. No changes in my medical history.   HISTORY OF IMMUNOSUPPRESSION: Do you have a history of any of the following:  Systemic Immunosuppression such as Diabetes, Biologic or Immunotherapy, Chemotherapy, Organ Transplantation, Bone Marrow Transplantation or Prednisone?  No     Answering \"YES\" requires the addition of the dotphrase \"IMMUNOSUPPRESSED\" as the first diagnosis of the patient's visit.   FAMILY HISTORY:  Any \"first degree relatives\" (parent, brother, sister, or child) with the following?    No changes in my family's known health.   PATIENT EXPERIENCE:    Do you want the Dermatologist to perform a COMPLETE skin exam today including a clinical examination under the \"bra and underwear\" areas?  Yes  If necessary, do we have your permission to call and leave a detailed message on your Preferred Phone number that includes your specific medical information?  Yes      Allergies   Allergen Reactions    Pollen Extract Sneezing     sneezing, itchy eyes      Current Outpatient Medications:     ALPRAZolam (XANAX) 0.5 mg tablet, Take 0.5 mg by mouth daily as needed, Disp: , Rfl:     amphetamine-dextroamphetamine (ADDERALL, 10MG,) 10 mg tablet, Take 1 tablet (10 mg total) by mouth daily Max Daily Amount: 10 mg, Disp: 30 tablet, Rfl: 0    Azelastine HCl 137 MCG/SPRAY SOLN, 1 SPRAY INTO EACH NOSTRIL 2 (TWO) TIMES A DAY " "AS NEEDED FOR RHINITIS USE IN EACH NOSTRIL AS DIRECTED, Disp: 90 mL, Rfl: 0    Multiple Vitamins-Minerals (WOMENS MULTIVITAMIN PO), Take by mouth, Disp: , Rfl:         Whom besides the patient is providing clinical information about today's encounter?   NO ADDITIONAL HISTORIAN (patient alone provided history)    Physical Exam and Assessment/Plan by Diagnosis:         MULTIPLE MELANOCYTIC NEVI (\"Moles\")  Physical Exam:  Anatomic Location Affected: Trunk and extremities  Morphological Description:  Scattered, round to ovoid, symmetrical-appearing, even bordered, skin colored to dark brown macules/papules  Denies pain, itch, bleeding. No treatments tried. Present for years. Present constantly; no modifying factors which make it worse or better. Denies actively changing or growing moles.      Assessment and Plan:  Based on a thorough discussion of this condition and the management approach to it (including a comprehensive discussion of the known risks, side effects and potential benefits of treatment), the patient (family) agrees to implement the following specific plan:  Reassure benign  Monitor for changes  Use sun protection.  Apply SPF 30 or higher at least three times a day.  Wear sun protecting clothing and hats.    Worrisome signs of skin malignancy discussed, questions answered. Regular self-skin check discussed. Advised to call or return to office if patient notices any spots of concern, rapidly growing/changing lesions, bleeding lesions, non-healing lesions. Advised regular SPF use.       CALCIFIED PILAR CYST  Physical Exam:  Anatomic Location Affected:  left vertex scalp   Morphological Description:  firm, mobile subcutaneous nodule  Pertinent Positives:  Pertinent Negatives:     Additional History of Present Condition:  present on exam -patient reports has been present about 5 years without change and is not bothersome     Assessment and Plan:  Based on a thorough discussion of this condition and the " "management approach to it (including a comprehensive discussion of the known risks, side effects and potential benefits of treatment), the patient (family) agrees to implement the following specific plan:  Reassured benign. Monitor for changes.   Discussed that if cyst becomes bothersome, it can be removed.       HAND DERMATITIS   Physical Exam:  Anatomic Location Affected:  hands - knuckles  Morphological Description:  dry, cracked fissures of the hands  Pertinent Positives:  Pertinent Negatives:    Additional History of Present Condition:  patient reports that her hands are severely dry and cracked and oftentimes gets clear bumps on the sides of her fingers that itch     Assessment and Plan:  Based on a thorough discussion of this condition and the management approach to it (including a comprehensive discussion of the known risks, side effects and potential benefits of treatment), the patient (family) agrees to implement the following specific plan:  START applying triamcinolone 0.1% ointment to the knuckles twice daily for up to 2 weeks.  START applying 1 drop of timolol 0.5% solution to the cracked fissures of the hands once daily as needed for maintenance.      NEOPLASM OF UNCERTAIN BEHAVIOR OF SKIN  Physical Exam:  (Anatomic Location); (Size and Morphological Description); (Differential Diagnosis):  A: right upper chest; 0.4 cm x 0.3 cm irregularly pigmented macule; nevus r/o atypia  Pertinent Positives:  Pertinent Negatives:      Additional History of Present Condition:  noted on exam.    Assessment and Plan:  I have discussed with the patient that a sample of skin via a \"skin biopsy” would be potentially helpful to further make a specific diagnosis under the microscope.  Based on a thorough discussion of this condition and the management approach to it (including a comprehensive discussion of the known risks, side effects and potential benefits of treatment), the patient (family) agrees to implement the " following specific plan:    Procedure:  Skin Biopsy.  After a thorough discussion of treatment options and risk/benefits/alternatives (including but not limited to local pain, scarring, dyspigmentation, blistering, possible superinfection, and inability to confirm a diagnosis via histopathology), verbal and written consent were obtained and portion of the rash was biopsied for tissue sample.  See below for consent that was obtained from patient and subsequent Procedure Note.    PROCEDURE TANGENTIAL (SHAVE) BIOPSY NOTE:  Performing Physician:  Dr. Car  Anatomic Location; Clinical Description with size (cm); Pre-Op Diagnosis:   A: right upper chest; 0.4 cm x 0.3 cm irregularly pigmented macule; nevi r/o atypia  Post-op diagnosis: Same     Local anesthesia: 1% xylocaine with epi      Topical anesthesia: None    Hemostasis: Aluminum chloride     After obtaining informed consent  at which time there was a discussion about the purpose of biopsy  and low risks of infection and bleeding.  The area was prepped and draped in the usual fashion. Anesthesia was obtained with 1% lidocaine with epinephrine. A shave biopsy to an appropriate sampling depth was obtained by Shave (Dermablade or 15 blade) The resulting wound was covered with surgical ointment and bandaged appropriately.     The patient tolerated the procedure well without complications and was without signs of functional compromise.      Specimen has been sent for review by Dermatopathology.    Standard post-procedure care has been explained and has been included in written form within the patient's copy of Informed Consent.    INFORMED CONSENT DISCUSSION AND POST-OPERATIVE INSTRUCTIONS FOR PATIENT    I.  RATIONALE FOR PROCEDURE  I understand that a skin biopsy allows the Dermatologist to test a lesion or rash under the microscope to obtain a diagnosis.  It usually involves numbing the area with numbing medication and removing a small piece of skin; sometimes the area  "will be closed with sutures. In this specific procedure, sutures are not usually needed.  If any sutures are placed, then they are usually need to be removed in 2 weeks or less.    I understand that my Dermatologist recommends that a skin \"shave\" biopsy be performed today.  A local anesthetic, similar to the kind that a dentist uses when filling a cavity, will be injected with a very small needle into the skin area to be sampled.  The injected skin and tissue underneath \"will go to sleep” and become numb so no pain should be felt afterwards.  An instrument shaped like a tiny \"razor blade\" (shave biopsy instrument) will be used to cut a small piece of tissue and skin from the area so that a sample of tissue can be taken and examined more closely under the microscope.  A slight amount of bleeding will occur, but it will be stopped with direct pressure and a pressure bandage and any other appropriate methods.  I understands that a scar will form where the wound was created.  Surgical ointment will be applied to help protect the wound.  Sutures are not usually needed.    II.  RISKS AND POTENTIAL COMPLICATIONS   I understand the risks and potential complications of a skin biopsy include but are not limited to the following:  Bleeding  Infection  Pain  Scar/keloid  Skin discoloration  Incomplete Removal  Recurrence  Nerve Damage/Numbness/Loss of Function  Allergic Reaction to Anesthesia  Biopsies are diagnostic procedures and based on findings additional treatment or evaluation may be required  Loss or destruction of specimen resulting in no additional findings    My Dermatologist has explained to me the nature of the condition, the nature of the procedure, and the benefits to be reasonably expected compared with alternative approaches.  My Dermatologist has discussed the likelihood of major risks or complications of this procedure including the specific risks listed above, such as bleeding, infection, and " "scarring/keloid.  I understand that a scar is expected after this procedure.  I understand that my physician cannot predict if the scar will form a \"keloid,\" which extends beyond the borders of the wound that is created.  A keloid is a thick, painful, and bumpy scar.  A keloid can be difficult to treat, as it does not always respond well to therapy, which includes injecting cortisone directly into the keloid every few weeks.  While this usually reduces the pain and size of the scar, it does not eliminate it.      I understand that photographs may be taken before and after the procedure.  These will be maintained as part of the medical providers confidential records and may not be made available to me.  I further authorize the medical provider to use the photographs for teaching purposes or to illustrate scientific papers, books, or lectures if in his/her judgment, medical research, education, or science may benefit from its use.    I have had an opportunity to fully inquire about the risks and benefits of this procedure and its alternatives.   I have been given ample time and opportunity to ask questions and to seek a second opinion if I wished to do so.  I acknowledge that there have specifically been no guarantees as to the cosmetic results from the procedure.  I am aware that with any procedure there is always the possibility of an unexpected complication.    III. POST-PROCEDURAL CARE (WHAT YOU WILL NEED TO DO \"AFTER THE BIOPSY\" TO OPTIMIZE HEALING)    Keep the area clean and dry.  Try NOT to remove the bandage or get it wet for the first 24 hours.    Gently clean the area and apply surgical ointment (such as Vaseline petrolatum ointment, which is available \"over the counter\" and not a prescription) to the biopsy site for up to 2 weeks straight.  This acts to protect the wound from the outside world.      Sutures are not usually placed in this procedure.  If any sutures were placed, return for suture removal as " instructed (generally 1 week for the face, 2 weeks for the body).      Take Acetaminophen (Tylenol) for discomfort, if no contraindications.  Ibuprofen or aspirin could make bleeding worse.    Call our office immediately for signs of infection: fever, chills, increased redness, warmth, tenderness, discomfort/pain, or pus or foul smell coming from the wound.    WHAT TO DO IF THERE IS ANY BLEEDING?  If a small amount of bleeding is noticed, place a clean cloth over the area and apply firm pressure for ten minutes.  Check the wound after 10 minutes of direct pressure.  If bleeding persists, try one more time for an additional 10 minutes of direct pressure on the area.  If the bleeding becomes heavier or does not stop after the second attempt, or if you have any other questions about this procedure, then please call your [facility] Dermatologist by calling [phone] (SKIN).     I hereby acknowledge that I have reviewed and verified the site with my Dermatologist and have requested and authorized my Dermatologist to proceed with the procedure.      Scribe Attestation      I,:  Gilma Sanford MA am acting as a scribe while in the presence of the attending physician.:       I,:  Cm Batista MD personally performed the services described in this documentation    as scribed in my presence.:           Alexa Car MD   PGY-2 Dermatology Resident

## 2025-03-21 ENCOUNTER — RESULTS FOLLOW-UP (OUTPATIENT)
Dept: DERMATOLOGY | Facility: CLINIC | Age: 35
End: 2025-03-21

## 2025-03-21 PROCEDURE — 88305 TISSUE EXAM BY PATHOLOGIST: CPT | Performed by: PATHOLOGY

## 2025-03-21 NOTE — RESULT ENCOUNTER NOTE
DERMATOPATHOLOGY RESULT NOTE    Results reviewed by ordering physician.  Called patient to personally discuss results. No answer, left voicemail with result.      Instructions for Clinical Derm Team:   (remember to route Result Note to appropriate staff):    None    Result & Plan by Specimen:    Specimen A: benign  Plan: reassured benign and to monitor for repigmentation/changes         Status: Final result      Dx: Neoplasm of uncertain behavior of skin    Test Result Released: Yes (seen)    View Follow-Up Encounter         Component  Ref Range & Units (hover)   Case Report  Surgical Pathology Report                         Case: [id number]                                  Authorizing Provider:  Alexa Car MD            Collected:           03/17/2025 0924              Ordering Location:     [facility]      Received:            03/17/2025 [address] [place] [address]                                                                Pathologist:           Sawyer Daly MD                                                      Specimen:    Skin, Other, A: right upper chest                                                        Final Diagnosis  A. Skin, right upper chest:  LENTIGINOUS COMPOUND MELANOCYTIC NEVUS WITH MILD ATYPIA    Electronically signed by Sawyer Daly MD on 3/21/2025 at 1315 EDT

## 2025-04-24 ENCOUNTER — TELEPHONE (OUTPATIENT)
Age: 35
End: 2025-04-24

## 2025-04-24 ENCOUNTER — OFFICE VISIT (OUTPATIENT)
Dept: PSYCHIATRY | Facility: CLINIC | Age: 35
End: 2025-04-24
Payer: COMMERCIAL

## 2025-04-24 DIAGNOSIS — F90.2 ATTENTION DEFICIT HYPERACTIVITY DISORDER (ADHD), COMBINED TYPE: Primary | ICD-10-CM

## 2025-04-24 DIAGNOSIS — F41.0 PANIC DISORDER WITHOUT AGORAPHOBIA: ICD-10-CM

## 2025-04-24 PROCEDURE — 90792 PSYCH DIAG EVAL W/MED SRVCS: CPT | Performed by: PHYSICIAN ASSISTANT

## 2025-04-24 RX ORDER — ALPRAZOLAM 0.5 MG
0.5 TABLET ORAL DAILY PRN
Qty: 10 TABLET | Refills: 0 | Status: SHIPPED | OUTPATIENT
Start: 2025-04-24 | End: 2025-05-04

## 2025-04-24 RX ORDER — DEXTROAMPHETAMINE SACCHARATE, AMPHETAMINE ASPARTATE, DEXTROAMPHETAMINE SULFATE AND AMPHETAMINE SULFATE 2.5; 2.5; 2.5; 2.5 MG/1; MG/1; MG/1; MG/1
10 TABLET ORAL DAILY
Qty: 30 TABLET | Refills: 0 | Status: SHIPPED | OUTPATIENT
Start: 2025-04-24
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
## 2025-04-24 NOTE — ASSESSMENT & PLAN NOTE
Continue Adderall 10 mg daily in the morning for ADHD.  Patient does not take on a daily basis and typically takes a few times per week.  She has noticed lower efficacy over the last few weeks.  She may want to try an alternative in the future.  She has been on this regimen for a few years.

## 2025-04-24 NOTE — TELEPHONE ENCOUNTER
----- Message from Grace Pagan PA-C sent at 4/24/2025  1:39 PM EDT -----  Regarding: Therapy  Hi!    Can you please place patient on the high priority list for therapy?    Thank you!    Grace

## 2025-05-20 ENCOUNTER — TELEPHONE (OUTPATIENT)
Age: 35
End: 2025-05-20

## 2025-05-20 DIAGNOSIS — F41.9 ANXIETY: Primary | ICD-10-CM

## 2025-05-20 DIAGNOSIS — J31.0 RHINITIS, UNSPECIFIED TYPE: ICD-10-CM

## 2025-05-20 RX ORDER — HYDROXYZINE HYDROCHLORIDE 25 MG/1
25 TABLET, FILM COATED ORAL EVERY 6 HOURS PRN
Qty: 60 TABLET | Refills: 0 | Status: SHIPPED | OUTPATIENT
Start: 2025-05-20

## 2025-05-20 NOTE — TELEPHONE ENCOUNTER
Pt returning call in regard to scheduling TT services. Pt confirms interest in services and would like to proceed with scheduling. Per previous encounter, writer contacted specified IC to assist further; call successfully warm transferred.

## 2025-05-20 NOTE — TELEPHONE ENCOUNTER
Reached out to pt to offer talk therapy appt with Heidy PALMA for pt to contact intake dept.        Upon return call, please route call  to  (Therese).

## 2025-05-22 ENCOUNTER — TELEMEDICINE (OUTPATIENT)
Dept: PSYCHIATRY | Facility: CLINIC | Age: 35
End: 2025-05-22
Payer: COMMERCIAL

## 2025-05-22 DIAGNOSIS — F90.2 ATTENTION DEFICIT HYPERACTIVITY DISORDER (ADHD), COMBINED TYPE: Primary | ICD-10-CM

## 2025-05-22 DIAGNOSIS — F41.0 PANIC DISORDER WITHOUT AGORAPHOBIA: ICD-10-CM

## 2025-05-22 PROCEDURE — 98006 SYNCH AUDIO-VIDEO EST MOD 30: CPT | Performed by: PHYSICIAN ASSISTANT

## 2025-05-22 RX ORDER — DEXTROAMPHETAMINE SACCHARATE, AMPHETAMINE ASPARTATE MONOHYDRATE, DEXTROAMPHETAMINE SULFATE AND AMPHETAMINE SULFATE 3.75; 3.75; 3.75; 3.75 MG/1; MG/1; MG/1; MG/1
15 CAPSULE, EXTENDED RELEASE ORAL EVERY MORNING
Qty: 10 CAPSULE | Refills: 0 | Status: SHIPPED | OUTPATIENT
Start: 2025-05-22 | End: 2025-06-01

## 2025-05-22 NOTE — ASSESSMENT & PLAN NOTE
Patient would like to trial an extended release form of medication to see if it can last longer throughout the day.  Will switch from Adderall 10 mg immediate release in the morning to Adderall XR 15 mg in the morning for ADHD.  We did discuss indication, potential side effects, and benefits.  We specifically discussed risk of decreased appetite, mood changes, and sleep disturbance.  Sent a short-term supply to her pharmacy and she will call with an update if she would like to continue and request a 30-day supply or if she will require an alternative.    Orders:    amphetamine-dextroamphetamine (ADDERALL XR, 15MG,) 15 MG 24 hr capsule; Take 1 capsule (15 mg total) by mouth every morning for 10 days Max Daily Amount: 15 mg

## 2025-05-22 NOTE — PSYCH
MEDICATION MANAGEMENT NOTE    Name: Leida Jean      : 1990      MRN: [id number]  Encounter Provider: Grace Pagan PA-C  Encounter Date: 2025   Encounter department: [facility]    Insurance: Payor: AETNA / Plan: AETNA PPO / Product Type: PPO /      Reason for Visit:   Chief Complaint   Patient presents with    Follow-up    Medication Management   :  Assessment & Plan  Attention deficit hyperactivity disorder (ADHD), combined type  Patient would like to trial an extended release form of medication to see if it can last longer throughout the day.  Will switch from Adderall 10 mg immediate release in the morning to Adderall XR 15 mg in the morning for ADHD.  We did discuss indication, potential side effects, and benefits.  We specifically discussed risk of decreased appetite, mood changes, and sleep disturbance.  Sent a short-term supply to her pharmacy and she will call with an update if she would like to continue and request a 30-day supply or if she will require an alternative.    Orders:    amphetamine-dextroamphetamine (ADDERALL XR, 15MG,) 15 MG 24 hr capsule; Take 1 capsule (15 mg total) by mouth every morning for 10 days Max Daily Amount: 15 mg    Panic disorder without agoraphobia  Continue Xanax 0.5 mg daily as needed for anxiety  Patient uses sparingly             Treatment Recommendations:    Educated about diagnosis and treatment modalities. Verbalizes understanding and agreement with the treatment plan.  Discussed self monitoring of symptoms, and symptom monitoring tools.  Discussed medications and if treatment adjustment was needed or desired.  Medication management every 2 months  Aware of 24 hour and weekend coverage for urgent situations accessed by calling [facility] main practice number  I am scheduling this patient out for greater than 3 months: No    Medications Risks/Benefits:      Risks, Benefits And Possible Side  "Effects Of Medications:    Risks, benefits, and possible side effects of medications explained to Leida and she (or legal representative) verbalizes understanding and agreement for treatment.    Controlled Medication Discussion:     Leida has been filling controlled prescriptions on time as prescribed according to [place] Prescription Drug Monitoring Program.      History of Present Illness     Leida is seen today for a follow up for ADHD and anxiety.  She reports that things have been \"better\" over the last few weeks.  She notes that she is felt more organized and she has been prioritizing her work more efficiently.  She notes that with her new job she found out that she is able to get therapy appointments.  She had a great therapy intake on Monday.  Today she would like to discuss trialing a longer acting stimulant for better efficacy.  She has been sleeping and getting adequate nutrition.    She denies suicidal ideation, intent or plan at present; denies homicidal ideation, intent or plan at present.    She denies auditory hallucinations, denies visual hallucinations, denies delusions.    She denies any side effects from current psychiatric medications.    HPI ROS Appetite Changes and Sleep:     She reports normal sleep, normal appetite, normal energy level    Review Of Systems: A review of systems is obtained and is negative except for the pertinent positives listed in HPI/Subjective above.      Current Rating Scores:     None completed today.    Areas of Improvement: reviewed in HPI/Subjective Section      Past Medical History[1]  Past Surgical History[2]  Allergies: Allergies[3]    Current Outpatient Medications   Medication Instructions    ALPRAZolam (XANAX) 0.5 mg, Oral, Daily PRN    amphetamine-dextroamphetamine (ADDERALL XR, 15MG,) 15 MG 24 hr capsule 15 mg, Oral, Every morning    Azelastine HCl 137 MCG/SPRAY SOLN 1 SPRAY INTO EACH NOSTRIL 2 (TWO) TIMES A DAY AS NEEDED FOR RHINITIS USE IN EACH NOSTRIL " AS DIRECTED    hydrOXYzine HCL (ATARAX) 25 mg, Oral, Every 6 hours PRN    Multiple Vitamins-Minerals (WOMENS MULTIVITAMIN PO) Take by mouth    timolol (BETIMOL) 0.5 % ophthalmic solution Apply 1 drop to open fissure on hands once daily as needed    triamcinolone (KENALOG) 0.1 % ointment Topical, 2 times daily, For 2 weeks to hands.        Substance Abuse History:    Tobacco, Alcohol and Drug Use History     Tobacco Use    Smoking status: Former     Current packs/day: 0.00     Average packs/day: 0.3 packs/day for 16.5 years (4.1 ttl pk-yrs)     Types: Cigarettes     Start date: 9/10/2005     Quit date: 3/1/2022     Years since quitting: 3.2    Smokeless tobacco: Never    Tobacco comments:     Still occasionally smoke Juul (vape)   Vaping Use    Vaping status: Some Days    Substances: Nicotine   Substance Use Topics    Alcohol use: Yes     Alcohol/week: 15.0 standard drinks of alcohol     Types: 3 Glasses of wine, 12 Cans of beer per week     Comment: On weekends- binge    Drug use: Never          Social History:    Social History     Socioeconomic History    Marital status: Single     Spouse name: Not on file    Number of children: Not on file    Years of education: Not on file    Highest education level: Not on file   Occupational History    Not on file   Other Topics Concern    Not on file   Social History Narrative    Long term relationship    Dogs 2    Working - now at home        Family Psychiatric History:     Family History[4]    Medical History Reviewed by provider this encounter:  Tobacco  Allergies  Meds  Problems  Med Hx  Surg Hx  Fam Hx          Objective   There were no vitals taken for this visit.     Mental Status Evaluation:    Appearance age appropriate, casually dressed, dressed appropriately   Behavior pleasant, cooperative, calm   Speech normal rate, normal volume, normal pitch, spontaneous   Mood euthymic   Affect normal range and intensity, appropriate   Thought Processes organized, goal  directed   Thought Content no overt delusions   Perceptual Disturbances: no auditory hallucinations, no visual hallucinations   Abnormal Thoughts  Risk Potential Suicidal ideation - None at present  Homicidal ideation - None at present  Potential for aggression - No   Orientation oriented to person, place, time/date, and situation   Memory recent and remote memory grossly intact   Consciousness alert and awake   Attention Span Concentration Span attention span and concentration are age appropriate   Intellect appears to be of average intelligence   Insight intact   Judgement intact   Muscle Strength and  Gait unable to assess today due to virtual visit   Motor activity no abnormal movements   Language no difficulty naming common objects, no difficulty repeating a phrase   Fund of Knowledge adequate knowledge of current events  adequate fund of knowledge regarding past history  adequate fund of knowledge regarding vocabulary        Laboratory Results: not applicable    Suicide/Homicide Risk Assessment:    Risk of Harm to Self:  Based on today's assessment, Leida presents the following risk of harm to self: none    Risk of Harm to Others:  Based on today's assessment, Leida presents the following risk of harm to others: none    The following interventions are recommended: Continue medication management. Continue psychotherapy. No other intervention changes indicated at this time.    Psychotherapy Provided:     Individual psychotherapy provided: No    Treatment Plan:    Completed and signed during the session: not completed    Goals: Progress towards Treatment Plan goals - in Assessment and Plan Section        Note Share:    This note was not shared with the patient due to this is a psychotherapy note    Administrative Statements   Administrative Statements   Encounter provider Grace Pagan PA-C    The Patient is located at Home and in the following state in which I hold an active license [place].    The patient was  "identified by name and date of birth. Leida Jean was informed that this is a telemedicine visit and that the visit is being conducted through the Epic Embedded platform. She agrees to proceed..  My office door was closed. No one else was in the room.  She acknowledged consent and understanding of privacy and security of the video platform. The patient has agreed to participate and understands they can discontinue the visit at any time.    I have spent a total time of 20 minutes in caring for this patient on the day of the visit/encounter including Risks and benefits of tx options, Instructions for management, Patient and family education, and Importance of tx compliance, not including the time spent for establishing the audio/video connection.    Visit Time  Visit Start Time: 430 pm  Visit Stop Time: 450pm  Total Visit Duration: 20 minutes    Portions of the record may have been created with voice recognition software. Occasional wrong word or \"sound a like\" substitutions may have occurred due to the inherent limitations of voice recognition software. Read the chart carefully and recognize, using context, where substitutions have occurred.    Grace Pagan PA-C 05/22/25       [1]   Past Medical History:  Diagnosis Date    Acne     Allergic     Concussion 2014    Fell in man hole    Depression 2004    Eczema     Headache(784.0)     Skin tag    [2]   Past Surgical History:  Procedure Laterality Date    APPENDECTOMY  December 2014    SKIN BIOPSY  April 2020   [3]   Allergies  Allergen Reactions    Pollen Extract Sneezing     sneezing, itchy eyes   [4]   Family History  Problem Relation Name Age of Onset    Arthritis Mother Julissa     Eczema Father Javier         Only around hands and elbows    Squamous cell carcinoma Father Javier     Acne Brother Mateus     Colon cancer Maternal Grandmother Shoshana     Hypertension Maternal Grandmother Shoshana     Stroke Maternal Grandmother Shoshana     Lung cancer Maternal " Grandfather Giorgio Waldron Paternal Grandmother Mya Jean

## 2025-07-08 ENCOUNTER — PATIENT MESSAGE (OUTPATIENT)
Age: 35
End: 2025-07-08

## 2025-07-10 ENCOUNTER — TELEPHONE (OUTPATIENT)
Dept: DERMATOLOGY | Facility: CLINIC | Age: 35
End: 2025-07-10

## 2025-07-10 NOTE — PATIENT COMMUNICATION
Received call from patient stating she sent a message through [employer] a few days ago with photos.    Patient stated she has poison karrie and would like Dr Piedra to prescribe her something for it.

## 2025-07-10 NOTE — TELEPHONE ENCOUNTER
LMOM advising pt that appt on 09/22/25 w/Shaneka needs to be r/s due to a change in her schedule, she will no longer be working Mondays or Fridays. Please give the office a call to r/s.  If pt calls back I do not need to speak to them, reschedule appropriately

## 2025-07-21 ENCOUNTER — TELEMEDICINE (OUTPATIENT)
Dept: PSYCHIATRY | Facility: CLINIC | Age: 35
End: 2025-07-21
Payer: COMMERCIAL

## 2025-07-21 DIAGNOSIS — F90.2 ATTENTION DEFICIT HYPERACTIVITY DISORDER (ADHD), COMBINED TYPE: Primary | ICD-10-CM

## 2025-07-21 DIAGNOSIS — F41.0 PANIC DISORDER WITHOUT AGORAPHOBIA: ICD-10-CM

## 2025-07-21 PROCEDURE — 99214 OFFICE O/P EST MOD 30 MIN: CPT | Performed by: PHYSICIAN ASSISTANT

## 2025-07-21 RX ORDER — DEXTROAMPHETAMINE SACCHARATE, AMPHETAMINE ASPARTATE, DEXTROAMPHETAMINE SULFATE AND AMPHETAMINE SULFATE 3.75; 3.75; 3.75; 3.75 MG/1; MG/1; MG/1; MG/1
15 TABLET ORAL DAILY
Qty: 30 TABLET | Refills: 0 | Status: SHIPPED | OUTPATIENT
Start: 2025-07-21 | End: 2025-08-20

## 2025-07-21 NOTE — PSYCH
MEDICATION MANAGEMENT NOTE    Name: Leida Jean      : 1990      MRN: [id number]  Encounter Provider: Grace Pagan PA-C  Encounter Date: 2025   Encounter department: [facility]    Insurance: Payor: AETNA / Plan: AETNA PPO / Product Type: PPO /      Reason for Visit:   Chief Complaint   Patient presents with    Virtual Regular Visit   :  Assessment & Plan  Attention deficit hyperactivity disorder (ADHD), combined type    After a trial of extended release Adderall patient reports that immediate release works better for her.  Will transition her back to immediate release and increased dose to 15 mg in the morning for ADHD.    Orders:    amphetamine-dextroamphetamine (ADDERALL, 15MG,) 15 MG tablet; Take 1 tablet (15 mg total) by mouth daily Max Daily Amount: 15 mg    Panic disorder without agoraphobia  Continue Xanax 0.5 mg daily as needed for anxiety  Patient uses sparingly                 Treatment Recommendations:    Educated about diagnosis and treatment modalities. Verbalizes understanding and agreement with the treatment plan.  Discussed self monitoring of symptoms, and symptom monitoring tools.  Discussed medications and if treatment adjustment was needed or desired.  Medication management every 3 months  Aware of 24 hour and weekend coverage for urgent situations accessed by calling [facility] main practice number  I am scheduling this patient out for greater than 3 months: No    Medications Risks/Benefits:      Risks, Benefits And Possible Side Effects Of Medications:    Risks, benefits, and possible side effects of medications explained to Leida and she (or legal representative) verbalizes understanding and agreement for treatment.    Controlled Medication Discussion:     Leida has been filling controlled prescriptions on time as prescribed according to [place] Prescription Drug Monitoring Program.      History of Present  Ayo Casarez is seen today for a follow up for ADHD and anxiety.  She reports that she is doing fairly well over the last few weeks.  Her main stressor today is that they have to replace their water heater.  She notes that there has been some shifts in her team at work and therefore her workload has increased.  She did get set up for therapy appointment that she gets through her job.  She is making a plan with her therapist to make a wind down routine and prioritize self-care.  She did trial the extended release version of Adderall but felt like it did not kick in when she needed it too.  She would prefer to return to the immediate release form and try an increased dose.  She has not needed to use her Xanax very often.    She denies suicidal ideation, intent or plan at present; denies homicidal ideation, intent or plan at present.    She denies auditory hallucinations, denies visual hallucinations, denies delusions.    She denies any side effects from current psychiatric medications.    HPI ROS Appetite Changes and Sleep:     She reports normal sleep, normal appetite, normal energy level    Review Of Systems: A review of systems is obtained and is negative except for the pertinent positives listed in HPI/Subjective above.      Current Rating Scores:     None completed today.    Areas of Improvement: reviewed in HPI/Subjective Section      Past Medical History[1]  Past Surgical History[2]  Allergies: Allergies[3]    Current Outpatient Medications   Medication Instructions    ALPRAZolam (XANAX) 0.5 mg, Oral, Daily PRN    amphetamine-dextroamphetamine (ADDERALL, 15MG,) 15 MG tablet 15 mg, Oral, Daily    Azelastine HCl 137 MCG/SPRAY SOLN 1 SPRAY INTO EACH NOSTRIL 2 (TWO) TIMES A DAY AS NEEDED FOR RHINITIS USE IN EACH NOSTRIL AS DIRECTED    hydrOXYzine HCL (ATARAX) 25 mg, Oral, Every 6 hours PRN    Multiple Vitamins-Minerals (WOMENS MULTIVITAMIN PO) Take by mouth    timolol (BETIMOL) 0.5 % ophthalmic solution Apply  1 drop to open fissure on hands once daily as needed    triamcinolone (KENALOG) 0.1 % ointment Topical, 2 times daily, For 2 weeks to hands.        Substance Abuse History:    Tobacco, Alcohol and Drug Use History     Tobacco Use    Smoking status: Former     Current packs/day: 0.00     Average packs/day: 0.3 packs/day for 16.5 years (4.1 ttl pk-yrs)     Types: Cigarettes     Start date: 9/10/2005     Quit date: 3/1/2022     Years since quitting: 3.3    Smokeless tobacco: Never    Tobacco comments:     Still occasionally smoke Juul (vape)   Vaping Use    Vaping status: Some Days    Substances: Nicotine   Substance Use Topics    Alcohol use: Yes     Alcohol/week: 15.0 standard drinks of alcohol     Types: 3 Glasses of wine, 12 Cans of beer per week     Comment: On weekends- binge    Drug use: Never          Social History:    Social History     Socioeconomic History    Marital status: Single     Spouse name: Not on file    Number of children: Not on file    Years of education: Not on file    Highest education level: Not on file   Occupational History    Not on file   Other Topics Concern    Not on file   Social History Narrative    Long term relationship    Dogs 2    Working - now at home        Family Psychiatric History:     Family History[4]    Medical History Reviewed by provider this encounter:  Tobacco  Allergies  Meds  Problems  Med Hx  Surg Hx  Fam Hx          Objective   There were no vitals taken for this visit.     Mental Status Evaluation:    Appearance age appropriate, casually dressed   Behavior pleasant, cooperative, calm   Speech normal rate, normal volume, normal pitch, spontaneous   Mood euthymic   Affect normal range and intensity, appropriate   Thought Processes organized, logical, coherent, goal directed   Thought Content no overt delusions   Perceptual Disturbances: no auditory hallucinations, no visual hallucinations   Abnormal Thoughts  Risk Potential Suicidal ideation - None  Homicidal  ideation - None  Potential for aggression - No   Orientation oriented to person, place, time/date, and situation   Memory recent and remote memory grossly intact   Consciousness alert and awake   Attention Span Concentration Span attention span and concentration are age appropriate   Intellect appears to be of average intelligence   Insight intact   Judgement intact   Muscle Strength and  Gait unable to assess today due to virtual visit   Motor activity unable to assess today due to virtual visit   Language no difficulty naming common objects, no difficulty repeating a phrase   Fund of Knowledge adequate knowledge of current events  adequate fund of knowledge regarding past history       Laboratory Results: Not applicable        Suicide/Homicide Risk Assessment:    Risk of Harm to Self:  Based on today's assessment, Leida presents the following risk of harm to self: none    Risk of Harm to Others:  Based on today's assessment, Leida presents the following risk of harm to others: none    The following interventions are recommended: Continue medication management. Continue psychotherapy. No other intervention changes indicated at this time.    Psychotherapy Provided:     Individual psychotherapy provided: No    Treatment Plan:    Completed and signed during the session: Not completed    Goals: Progress towards Treatment Plan goals - in Assessment and Plan Section      Note Share:    This note was shared with patient.    Administrative Statements   Administrative Statements   Encounter provider Grace Pagan PA-C    The Patient is located at Home and in the following state in which I [place] an [place] license [place].    The patient was identified by name and date of birth. Leida Jean was informed that this is a telemedicine visit and that the visit is being conducted through the Epic Embedded platform. She agrees to proceed..  My office door was closed. No one else was in the room.  She acknowledged consent and  "understanding of privacy and security of the video platform. The patient has agreed to participate and understands they can discontinue the visit at any time.    I have spent a total time of 16 minutes in caring for this patient on the day of the visit/encounter including Risks and benefits of tx options, Instructions for management, Patient and family education, and Importance of tx compliance, not including the time spent for establishing the audio/video connection.    Visit Time  Visit Start Time: 3:00 PM  Visit Stop Time: 3:16 PM  Total Visit Duration: 16 minutes    Portions of the record may have been created with voice recognition software. Occasional wrong word or \"sound a like\" substitutions may have occurred due to the inherent limitations of voice recognition software. Read the chart carefully and recognize, using context, where substitutions have occurred.    Grace Pagan PA-C 07/21/25       [1]   Past Medical History:  Diagnosis Date    Acne     Allergic     Concussion 2014    Fell in man hole    Depression 2004    Eczema     Headache(784.0)     Skin tag    [2]   Past Surgical History:  Procedure Laterality Date    APPENDECTOMY  December 2014    SKIN BIOPSY  April 2020   [3]   Allergies  Allergen Reactions    Pollen Extract Sneezing     sneezing, itchy eyes   [4]   Family History  Problem Relation Name Age of Onset    Arthritis Mother Julissa     Eczema Father Javier         Only around hands and elbows    Squamous cell carcinoma Father Javier     Acne Brother Mateus     Colon cancer Maternal Grandmother Shoshana     Hypertension Maternal Grandmother Shoshana     Stroke Maternal Grandmother Shoshana     Lung cancer Maternal Grandfather Giorgio     Dementia Paternal Grandmother Mya Jean      "

## 2025-07-21 NOTE — ASSESSMENT & PLAN NOTE
After a trial of extended release Adderall patient reports that immediate release works better for her.  Will transition her back to immediate release and increased dose to 15 mg in the morning for ADHD.    Orders:    amphetamine-dextroamphetamine (ADDERALL, 15MG,) 15 MG tablet; Take 1 tablet (15 mg total) by mouth daily Max Daily Amount: 15 mg

## 2025-07-25 RX ORDER — METRONIDAZOLE 500 MG/1
1 TABLET ORAL 2 TIMES DAILY
COMMUNITY
Start: 2025-05-31 | End: 2025-07-29 | Stop reason: ALTCHOICE

## 2025-07-29 ENCOUNTER — OFFICE VISIT (OUTPATIENT)
Dept: INTERNAL MEDICINE CLINIC | Facility: CLINIC | Age: 35
End: 2025-07-29
Payer: COMMERCIAL

## 2025-07-29 VITALS
DIASTOLIC BLOOD PRESSURE: 76 MMHG | WEIGHT: 162 LBS | RESPIRATION RATE: 14 BRPM | OXYGEN SATURATION: 98 % | HEART RATE: 80 BPM | SYSTOLIC BLOOD PRESSURE: 110 MMHG | HEIGHT: 66 IN | TEMPERATURE: 99.6 F | BODY MASS INDEX: 26.03 KG/M2

## 2025-07-29 DIAGNOSIS — D72.819 LEUKOPENIA, UNSPECIFIED TYPE: ICD-10-CM

## 2025-07-29 DIAGNOSIS — M79.18 MYOFASCIAL PAIN SYNDROME, CERVICAL: ICD-10-CM

## 2025-07-29 DIAGNOSIS — J31.0 RHINITIS, UNSPECIFIED TYPE: ICD-10-CM

## 2025-07-29 DIAGNOSIS — F41.9 ANXIETY: ICD-10-CM

## 2025-07-29 DIAGNOSIS — B37.9 YEAST INFECTION: ICD-10-CM

## 2025-07-29 DIAGNOSIS — D23.5 DYSPLASTIC NEVUS OF TRUNK: ICD-10-CM

## 2025-07-29 DIAGNOSIS — F41.0 PANIC DISORDER WITHOUT AGORAPHOBIA: ICD-10-CM

## 2025-07-29 DIAGNOSIS — F90.2 ATTENTION DEFICIT HYPERACTIVITY DISORDER (ADHD), COMBINED TYPE: Primary | ICD-10-CM

## 2025-07-29 PROBLEM — U07.1 COVID-19 VIRUS INFECTION: Status: RESOLVED | Noted: 2024-09-27 | Resolved: 2025-07-29

## 2025-07-29 PROCEDURE — 99214 OFFICE O/P EST MOD 30 MIN: CPT | Performed by: INTERNAL MEDICINE

## 2025-07-29 RX ORDER — METRONIDAZOLE 7.5 MG/G
1 GEL VAGINAL 2 TIMES DAILY
Qty: 70 G | Refills: 0 | Status: SHIPPED | OUTPATIENT
Start: 2025-07-29

## 2025-07-29 RX ORDER — CYCLOBENZAPRINE HCL 5 MG
5 TABLET ORAL 3 TIMES DAILY PRN
Qty: 60 TABLET | Refills: 0 | Status: SHIPPED | OUTPATIENT
Start: 2025-07-29